# Patient Record
Sex: FEMALE | Race: OTHER | NOT HISPANIC OR LATINO
[De-identification: names, ages, dates, MRNs, and addresses within clinical notes are randomized per-mention and may not be internally consistent; named-entity substitution may affect disease eponyms.]

---

## 2022-07-11 ENCOUNTER — APPOINTMENT (OUTPATIENT)
Dept: ORTHOPEDIC SURGERY | Facility: CLINIC | Age: 57
End: 2022-07-11
Payer: COMMERCIAL

## 2022-07-11 VITALS — WEIGHT: 215 LBS | HEIGHT: 66 IN | BODY MASS INDEX: 34.55 KG/M2

## 2022-07-11 DIAGNOSIS — I10 ESSENTIAL (PRIMARY) HYPERTENSION: ICD-10-CM

## 2022-07-11 DIAGNOSIS — Z78.9 OTHER SPECIFIED HEALTH STATUS: ICD-10-CM

## 2022-07-11 PROCEDURE — 20611 DRAIN/INJ JOINT/BURSA W/US: CPT | Mod: 50

## 2022-07-11 PROCEDURE — 99213 OFFICE O/P EST LOW 20 MIN: CPT | Mod: 25

## 2022-07-11 PROCEDURE — J3490M: CUSTOM

## 2022-07-11 NOTE — PROCEDURE
[FreeTextEntry3] : Large Joint Injection was performed because of pain and inflammation. Anesthesia: ethyl chloride sprayed topically.. \par Celestone: 2 cc. \par Lidocaine: 3 cc. \par Marcaine: 3 cc. \par \par Medication was injected in the B/L KNEES. Patient has tried OTC's including aspirin, Ibuprofen, Aleve etc or prescription NSAIDS, and/or exercises at home and/ or physical therapy without satisfactory response and Patient has decreased mobility in the joint. After verbal consent using sterile preparation and technique. The risks, benefits, and alternatives to cortisone injection were explained in full to the patient. Risks outlined include but are not limited to infection, sepsis, bleeding, scarring, skin discoloration, temporary increase in pain, syncopal episode, failure to resolve symptoms, allergic reaction, symptom recurrence, and elevation of blood sugar in diabetics. Patient understood the risks. All questions were answered. After discussion of options, patient requested an injection. Oral informed consent was obtained and sterile prep was done of the injection site. Sterile technique was utilized for the procedure including the preparation of the solutions used for the injection. Patient tolerated the procedure well. Advised to ice the injection site this evening. Prep with betadine locally to site. Sterile technique used and Prep with alcohol locally to site. Sterile technique used. Patient tolerated procedure well. Post Procedure Instructions: Patient was advised to call if redness, pain, or fever occur and apply ice for 15 min. out of every hour for the next 12-24 hours as tolerated. patient was advised to rest the joint(s) for 2 days. \par \par Ultrasound guidance was indicated for this patient due to difficult injection. All ultrasound images have been permanently captured and stored accordingly in our picture archiving and communication system. Visualization of the needle and placement of injection was performed without complication.\par

## 2022-07-11 NOTE — HISTORY OF PRESENT ILLNESS
[Left Leg] : left leg [Right Leg] : right leg [Gradual] : gradual [8] : 8 [7] : 7 [Localized] : localized [Intermittent] : intermittent [Injection therapy] : injection therapy [Standing] : standing [Walking] : walking [Stairs] : stairs [de-identified] : Patient Complaint - 3/16/22: FOLLOW UP B/L KNEES. RIGHT KNEE STARTED BECOMING PAINFUL 6 DAYS AGO. SHE HAS\par BEEN LIMPING DUE TO THE PAIN. WORSE WITH STAIRS. LEFT KNEE IS BEGINNING TO HURT AS WELL. NO F/C/S.\par History of Present Illness\par 12/14/16: PATIENT IS A 52 YO FEMALE C/O RIGHT KNEE PAIN SINCE OCT 2016 WITH NO SPECIFIC INJURY. STATES SHE\par STARTED TO GET BETTER BUT HER PAIN CONTINUES. TAKING NAPROXEN FOR PAIN. SHE HAD XRAYS AT [facility]. PAIN\par IS WORSE WITH GOING DOWN STAIRS. STATES HER KNEE FEELS IT WILL "GIVE OUT." NO PREVIOUS INJURIES OR\par SURGERIES TO RIGHT KNEE. OCCUPATION: [employer]. NO MRI RIGHT KNEE YET\par 1/13/17: INJECTION HELPED BUT THINKS SWELLING RECURRED. HERE FOR SUPARTZ #1 RIGHT KNEE\par 1/30/17: RIGHT KNEE SUPARTZ #1\par 2/15/17: SUPARTZ #2 RIGHT KNEE\par 2/22/17: F/U RIGHT KNEE MRI\par 3/27/17: HERE FOR EUFLEXXA #1 RIGHT KNEE.\par 4/5/17: EUFLEXXA #2 RIGHT KNEE\par 4/12/17: EUFLEXXA #3 RIGHT KNEE.\par 7/24/17: HERE TO F/U ON RIGHT KNEE. WAS DOING WELL, WENT AWAY ON VACATION AND PAIN RETURNED LAST WEEK.\par OCC. FEELING OF "GIVING OUT."\par 2/27/19: PAIN RECURRED NOW LEFT WORSE THAN RIGHT WITH OCC GIVING OUT, GOOD RELIEF WITH EUFLEXXA LAST\par TIME. CANNOT HAVE SUPARTZ DUE TO ALLERGIC REACTION\par 4/10/19: HERE FOR F/U RIGHT KNEE. WILL START EUFLEXXA # 1 TODAY SUKUMAR KNEES.\par 4/17/2019: EUFLEXXA #2 BILATERAL KNEES TODAY. NO ADVERSE REACTIONS FROM FIRST SET OF INJECTION LAST\par VISIT.\par 4/24/19: EUFLEXXA #3 BILATERAL KNEES\par 6/30/21: PAIN RECURRED, NO NEW INJURY\par 8/4/21: SYNVISC #1 B/L KNEES\par 8/18/21: SYNVISC #2 B/L KNEES\par 8/25/21: SYNVISC #3 B/L KNEES\par 7/11/22: FOLLOW UP KNEES. PAIN RETURNING. GOOD RELIEF FROM [facility]SI 4 MONTHS AGO. [] : no [FreeTextEntry1] : KNEES [FreeTextEntry5] : NONE  [de-identified] : NOTHING

## 2022-07-11 NOTE — ASSESSMENT
[FreeTextEntry1] : ACUTE EXACERBATION OF CHRONIC KNEE OA\par STABLE EXAM & SYMPTOMS\par CSI DISCUSSED AND DONE TODAY, TOLERATED WELL\par EUFLEXXA  AUTH REQUESTED\par RTO P AUTH

## 2022-07-11 NOTE — IMAGING
[de-identified] : B/L KNEES\par 1. No effusion, no warmth, no ecchymosis, no erythema.\par 2. MEDIAL & LATERAL JOINT TTP\par 3. Range of motion 0-130 without pain\par 4. 5/5 quadriceps and hamstring strength\par 5. Negative Lachman, negative Abhijeet, negative anterior drawer, negative posterior drawer, negative patella apprehension; no extensor lag: no varus or valgus instability.\par 6. Motor and sensory intact distally\par 7. Negative Moose\par 8. Non-antalgic gait\par

## 2022-07-13 ENCOUNTER — APPOINTMENT (OUTPATIENT)
Dept: ORTHOPEDIC SURGERY | Facility: CLINIC | Age: 57
End: 2022-07-13

## 2022-07-13 RX ORDER — HYALURONATE SODIUM 16.8MG/2ML
16.8 SYRINGE (ML) INTRAARTICULAR
Qty: 6 | Refills: 0 | Status: ACTIVE | COMMUNITY
Start: 2022-07-13 | End: 1900-01-01

## 2022-08-17 ENCOUNTER — APPOINTMENT (OUTPATIENT)
Dept: ORTHOPEDIC SURGERY | Facility: CLINIC | Age: 57
End: 2022-08-17
Payer: COMMERCIAL

## 2022-08-17 VITALS — WEIGHT: 215 LBS | HEIGHT: 66 IN | BODY MASS INDEX: 34.55 KG/M2

## 2022-08-17 PROCEDURE — 99213 OFFICE O/P EST LOW 20 MIN: CPT | Mod: 25

## 2022-08-17 PROCEDURE — 20611 DRAIN/INJ JOINT/BURSA W/US: CPT | Mod: 50

## 2022-08-31 ENCOUNTER — APPOINTMENT (OUTPATIENT)
Dept: ORTHOPEDIC SURGERY | Facility: CLINIC | Age: 57
End: 2022-08-31
Payer: COMMERCIAL

## 2022-08-31 VITALS — WEIGHT: 215 LBS | HEIGHT: 66 IN | BODY MASS INDEX: 34.55 KG/M2

## 2022-08-31 PROCEDURE — 20611 DRAIN/INJ JOINT/BURSA W/US: CPT | Mod: 50

## 2022-08-31 PROCEDURE — 99212 OFFICE O/P EST SF 10 MIN: CPT | Mod: 25

## 2022-08-31 NOTE — ASSESSMENT
[FreeTextEntry1] : ACUTE EXACERBATION OF CHRONIC KNEE OA\par STABLE EXAM & SYMPTOMS\par GELSYN #2 B/L KNEES, TOLERATED WELL\par RTO 1 WEEK TO CONTINUE

## 2022-08-31 NOTE — HISTORY OF PRESENT ILLNESS
[5] : 5 [Dull/Aching] : dull/aching [] : This patient has had an injection before: yes [2] : 2 [Other:____] : [unfilled] [de-identified] : Patient Complaint - 3/16/22: FOLLOW UP B/L KNEES. RIGHT KNEE STARTED BECOMING PAINFUL 6 DAYS AGO. SHE HAS\par BEEN LIMPING DUE TO THE PAIN. WORSE WITH STAIRS. LEFT KNEE IS BEGINNING TO HURT AS WELL. NO F/C/S.\par History of Present Illness\par 12/14/16: PATIENT IS A 50 YO FEMALE C/O RIGHT KNEE PAIN SINCE OCT 2016 WITH NO SPECIFIC INJURY. STATES SHE\par STARTED TO GET BETTER BUT HER PAIN CONTINUES. TAKING NAPROXEN FOR PAIN. SHE HAD XRAYS AT [facility]. PAIN\par IS WORSE WITH GOING DOWN STAIRS. STATES HER KNEE FEELS IT WILL "GIVE OUT." NO PREVIOUS INJURIES OR\par SURGERIES TO RIGHT KNEE. OCCUPATION: [employer]. NO MRI RIGHT KNEE YET\par 1/13/17: INJECTION HELPED BUT THINKS SWELLING RECURRED. HERE FOR SUPARTZ #1 RIGHT KNEE\par 1/30/17: RIGHT KNEE SUPARTZ #1\par 2/15/17: SUPARTZ #2 RIGHT KNEE\par 2/22/17: F/U RIGHT KNEE MRI\par 3/27/17: HERE FOR EUFLEXXA #1 RIGHT KNEE.\par 4/5/17: EUFLEXXA #2 RIGHT KNEE\par 4/12/17: EUFLEXXA #3 RIGHT KNEE.\par 7/24/17: HERE TO F/U ON RIGHT KNEE. WAS DOING WELL, WENT AWAY ON VACATION AND PAIN RETURNED LAST WEEK.\par OCC. FEELING OF "GIVING OUT."\par 2/27/19: PAIN RECURRED NOW LEFT WORSE THAN RIGHT WITH OCC GIVING OUT, GOOD RELIEF WITH EUFLEXXA LAST\par TIME. CANNOT HAVE SUPARTZ DUE TO ALLERGIC REACTION\par 4/10/19: HERE FOR F/U RIGHT KNEE. WILL START EUFLEXXA # 1 TODAY SUKUMAR KNEES.\par 4/17/2019: EUFLEXXA #2 BILATERAL KNEES TODAY. NO ADVERSE REACTIONS FROM FIRST SET OF INJECTION LAST\par VISIT.\par 4/24/19: EUFLEXXA #3 BILATERAL KNEES\par 6/30/21: PAIN RECURRED, NO NEW INJURY\par 8/4/21: SYNVISC #1 B/L KNEES\par 8/18/21: SYNVISC #2 B/L KNEES\par 8/25/21: SYNVISC #3 B/L KNEES\par 7/11/22: FOLLOW UP KNEES. PAIN RETURNING. GOOD RELIEF FROM [facility] 4 MONTHS AGO.\par 8/17/22: FOLLOW UP KNEES\par 8/31/22: FOLLOW UP KNEES, gelsyn #2 [FreeTextEntry1] : b/l knee [de-identified] : 8/17/22 [de-identified] : b/l knee

## 2022-08-31 NOTE — PROCEDURE
[FreeTextEntry3] : Large joint injection was performed of the BILATERAL knee. The indication for this procedure was pain and inflammation. The site was prepped with alcohol, betadine, ethyl chloride sprayed topically and sterile technique used. An injection of GELSYN series #2 was used.\par \par Patient tolerated procedure well. Patient was advised to call if redness, pain of fever occur, apply ice for 15 minutes out of every hours for the next 12-24 hours as tolerated and patient was advised to rest the joint(s) for 2 days.\par \par Patient has tried OTC's including aspirin, ibuprofen, Aleve, etc or prescription NSAIDs, and/or exercises at home and/or physical therapy without satisfactory response, patient had decreased mobility in the joint and the risks, benefits and alternatives have been discussed, and verbal consent was obtained.\par \par Ultrasound guidance was indicated for this patient due to difficult injection. All ultrasound images have been permanently captured and stored accordingly in our picture archiving and communication system. Visualization of the needle and placement of injection was performed without complication.\par

## 2022-08-31 NOTE — IMAGING
[de-identified] : B/L KNEES\par 1. No effusion, no warmth, no ecchymosis, no erythema.\par 2. MEDIAL & LATERAL JOINT TTP\par 3. Range of motion 0-130 without pain\par 4. 5/5 quadriceps and hamstring strength\par 5. Negative Lachman, negative Abhijeet, negative anterior drawer, negative posterior drawer, negative patella apprehension; no extensor lag: no varus or valgus instability.\par 6. Motor and sensory intact distally\par 7. Negative Moose\par 8. Non-antalgic gait\par

## 2022-09-07 ENCOUNTER — APPOINTMENT (OUTPATIENT)
Dept: ORTHOPEDIC SURGERY | Facility: CLINIC | Age: 57
End: 2022-09-07
Payer: COMMERCIAL

## 2022-09-07 VITALS — HEIGHT: 66 IN | WEIGHT: 215 LBS | BODY MASS INDEX: 34.55 KG/M2

## 2022-09-07 PROCEDURE — 20605 DRAIN/INJ JOINT/BURSA W/O US: CPT | Mod: 50

## 2022-09-07 PROCEDURE — 99212 OFFICE O/P EST SF 10 MIN: CPT | Mod: 25

## 2022-09-07 NOTE — ASSESSMENT
[FreeTextEntry1] : ACUTE EXACERBATION OF CHRONIC KNEE OA\par STABLE EXAM & SYMPTOMS\par GELSYN #3 B/L KNEES, TOLERATED WELL\par DISCUSSED TIMING OF INJECTIONS Universal Safety Interventions

## 2022-09-07 NOTE — HISTORY OF PRESENT ILLNESS
[de-identified] : Patient Complaint - 3/16/22: FOLLOW UP B/L KNEES. RIGHT KNEE STARTED BECOMING PAINFUL 6 DAYS AGO. SHE HAS\par BEEN LIMPING DUE TO THE PAIN. WORSE WITH STAIRS. LEFT KNEE IS BEGINNING TO HURT AS WELL. NO F/C/S.\par History of Present Illness\par 12/14/16: PATIENT IS A 52 YO FEMALE C/O RIGHT KNEE PAIN SINCE OCT 2016 WITH NO SPECIFIC INJURY. STATES SHE\par STARTED TO GET BETTER BUT HER PAIN CONTINUES. TAKING NAPROXEN FOR PAIN. SHE HAD XRAYS AT [facility]. PAIN\par IS WORSE WITH GOING DOWN STAIRS. STATES HER KNEE FEELS IT WILL "GIVE OUT." NO PREVIOUS INJURIES OR\par SURGERIES TO RIGHT KNEE. OCCUPATION: [employer]. NO MRI RIGHT KNEE YET\par 1/13/17: INJECTION HELPED BUT THINKS SWELLING RECURRED. HERE FOR SUPARTZ #1 RIGHT KNEE\par 1/30/17: RIGHT KNEE SUPARTZ #1\par 2/15/17: SUPARTZ #2 RIGHT KNEE\par 2/22/17: F/U RIGHT KNEE MRI\par 3/27/17: HERE FOR EUFLEXXA #1 RIGHT KNEE.\par 4/5/17: EUFLEXXA #2 RIGHT KNEE\par 4/12/17: EUFLEXXA #3 RIGHT KNEE.\par 7/24/17: HERE TO F/U ON RIGHT KNEE. WAS DOING WELL, WENT AWAY ON VACATION AND PAIN RETURNED LAST WEEK.\par OCC. FEELING OF "GIVING OUT."\par 2/27/19: PAIN RECURRED NOW LEFT WORSE THAN RIGHT WITH OCC GIVING OUT, GOOD RELIEF WITH EUFLEXXA LAST\par TIME. CANNOT HAVE SUPARTZ DUE TO ALLERGIC REACTION\par 4/10/19: HERE FOR F/U RIGHT KNEE. WILL START EUFLEXXA # 1 TODAY SUKUMAR KNEES.\par 4/17/2019: EUFLEXXA #2 BILATERAL KNEES TODAY. NO ADVERSE REACTIONS FROM FIRST SET OF INJECTION LAST\par VISIT.\par 4/24/19: EUFLEXXA #3 BILATERAL KNEES\par 6/30/21: PAIN RECURRED, NO NEW INJURY\par 8/4/21: SYNVISC #1 B/L KNEES\par 8/18/21: SYNVISC #2 B/L KNEES\par 8/25/21: SYNVISC #3 B/L KNEES\par 7/11/22: FOLLOW UP KNEES. PAIN RETURNING. GOOD RELIEF FROM [facility] 4 MONTHS AGO.\par 8/17/22: FOLLOW UP KNEES\par 8/31/22: FOLLOW UP KNEES, gelsyn #2\par 9/7/22: FOLLOW UP KNEES, GELSYN #3, FEELING BETTER [FreeTextEntry1] : knees

## 2022-09-07 NOTE — IMAGING
[de-identified] : B/L KNEES\par 1. No effusion, no warmth, no ecchymosis, no erythema.\par 2. MEDIAL & LATERAL JOINT TTP\par 3. Range of motion 0-130 without pain\par 4. 5/5 quadriceps and hamstring strength\par 5. Negative Lachman, negative Abhijeet, negative anterior drawer, negative posterior drawer, negative patella apprehension; no extensor lag: no varus or valgus instability.\par 6. Motor and sensory intact distally\par 7. Negative Moose\par 8. Non-antalgic gait\par

## 2022-09-07 NOTE — PROCEDURE
[FreeTextEntry3] : Large joint injection was performed of the BILATERAL knee. The indication for this procedure was pain and inflammation. The site was prepped with alcohol, betadine, ethyl chloride sprayed topically and sterile technique used. An injection of GELSYN series #3 was used.\par \par Patient tolerated procedure well. Patient was advised to call if redness, pain of fever occur, apply ice for 15 minutes out of every hours for the next 12-24 hours as tolerated and patient was advised to rest the joint(s) for 2 days.\par \par Patient has tried OTC's including aspirin, ibuprofen, Aleve, etc or prescription NSAIDs, and/or exercises at home and/or physical therapy without satisfactory response, patient had decreased mobility in the joint and the risks, benefits and alternatives have been discussed, and verbal consent was obtained.\par \par Ultrasound guidance was indicated for this patient due to difficult injection. All ultrasound images have been permanently captured and stored accordingly in our picture archiving and communication system. Visualization of the needle and placement of injection was performed without complication.\par

## 2022-09-28 NOTE — PROCEDURE
[FreeTextEntry3] : Large joint injection was performed of the BILATERAL knee. The indication for this procedure was pain and inflammation. The site was prepped with alcohol, betadine, ethyl chloride sprayed topically and sterile technique used. An injection of GELSYN series #1 was used.\par \par Patient tolerated procedure well. Patient was advised to call if redness, pain of fever occur, apply ice for 15 minutes out of every hours for the next 12-24 hours as tolerated and patient was advised to rest the joint(s) for 2 days.\par \par Patient has tried OTC's including aspirin, ibuprofen, Aleve, etc or prescription NSAIDs, and/or exercises at home and/or physical therapy without satisfactory response, patient had decreased mobility in the joint and the risks, benefits and alternatives have been discussed, and verbal consent was obtained.\par \par Ultrasound guidance was indicated for this patient due to difficult injection. All ultrasound images have been permanently captured and stored accordingly in our picture archiving and communication system. Visualization of the needle and placement of injection was performed without complication.\par

## 2022-09-28 NOTE — HISTORY OF PRESENT ILLNESS
[6] : 6 [5] : 5 [Dull/Aching] : dull/aching [de-identified] : Patient Complaint - 3/16/22: FOLLOW UP B/L KNEES. RIGHT KNEE STARTED BECOMING PAINFUL 6 DAYS AGO. SHE HAS\par BEEN LIMPING DUE TO THE PAIN. WORSE WITH STAIRS. LEFT KNEE IS BEGINNING TO HURT AS WELL. NO F/C/S.\par History of Present Illness\par 12/14/16: PATIENT IS A 50 YO FEMALE C/O RIGHT KNEE PAIN SINCE OCT 2016 WITH NO SPECIFIC INJURY. STATES SHE\par STARTED TO GET BETTER BUT HER PAIN CONTINUES. TAKING NAPROXEN FOR PAIN. SHE HAD XRAYS AT [facility]. PAIN\par IS WORSE WITH GOING DOWN STAIRS. STATES HER KNEE FEELS IT WILL "GIVE OUT." NO PREVIOUS INJURIES OR\par SURGERIES TO RIGHT KNEE. OCCUPATION: [employer]. NO MRI RIGHT KNEE YET\par 1/13/17: INJECTION HELPED BUT THINKS SWELLING RECURRED. HERE FOR SUPARTZ #1 RIGHT KNEE\par 1/30/17: RIGHT KNEE SUPARTZ #1\par 2/15/17: SUPARTZ #2 RIGHT KNEE\par 2/22/17: F/U RIGHT KNEE MRI\par 3/27/17: HERE FOR EUFLEXXA #1 RIGHT KNEE.\par 4/5/17: EUFLEXXA #2 RIGHT KNEE\par 4/12/17: EUFLEXXA #3 RIGHT KNEE.\par 7/24/17: HERE TO F/U ON RIGHT KNEE. WAS DOING WELL, WENT AWAY ON VACATION AND PAIN RETURNED LAST WEEK.\par OCC. FEELING OF "GIVING OUT."\par 2/27/19: PAIN RECURRED NOW LEFT WORSE THAN RIGHT WITH OCC GIVING OUT, GOOD RELIEF WITH EUFLEXXA LAST\par TIME. CANNOT HAVE SUPARTZ DUE TO ALLERGIC REACTION\par 4/10/19: HERE FOR F/U RIGHT KNEE. WILL START EUFLEXXA # 1 TODAY SUKUMAR KNEES.\par 4/17/2019: EUFLEXXA #2 BILATERAL KNEES TODAY. NO ADVERSE REACTIONS FROM FIRST SET OF INJECTION LAST\par VISIT.\par 4/24/19: EUFLEXXA #3 BILATERAL KNEES\par 6/30/21: PAIN RECURRED, NO NEW INJURY\par 8/4/21: SYNVISC #1 B/L KNEES\par 8/18/21: SYNVISC #2 B/L KNEES\par 8/25/21: SYNVISC #3 B/L KNEES\par 7/11/22: FOLLOW UP KNEES. PAIN RETURNING. GOOD RELIEF FROM [facility] 4 MONTHS AGO.\par 8/17/22: FOLLOW UP KNEES [FreeTextEntry1] : knees [de-identified] : ice,meds

## 2022-09-28 NOTE — ASSESSMENT
[FreeTextEntry1] : ACUTE EXACERBATION OF CHRONIC KNEE OA\par STABLE EXAM & SYMPTOMS\par GELSYN #1 B/L KNEES, TOLERATED WELL\par RTO 1 WEEK TO CONTINUE

## 2022-09-28 NOTE — IMAGING
[de-identified] : B/L KNEES\par 1. No effusion, no warmth, no ecchymosis, no erythema.\par 2. MEDIAL & LATERAL JOINT TTP\par 3. Range of motion 0-130 without pain\par 4. 5/5 quadriceps and hamstring strength\par 5. Negative Lachman, negative Abhijeet, negative anterior drawer, negative posterior drawer, negative patella apprehension; no extensor lag: no varus or valgus instability.\par 6. Motor and sensory intact distally\par 7. Negative Moose\par 8. Non-antalgic gait\par

## 2023-02-13 ENCOUNTER — APPOINTMENT (OUTPATIENT)
Dept: ORTHOPEDIC SURGERY | Facility: CLINIC | Age: 58
End: 2023-02-13

## 2023-03-22 ENCOUNTER — APPOINTMENT (OUTPATIENT)
Dept: ORTHOPEDIC SURGERY | Facility: CLINIC | Age: 58
End: 2023-03-22
Payer: COMMERCIAL

## 2023-03-22 VITALS — HEIGHT: 66 IN | WEIGHT: 215 LBS | BODY MASS INDEX: 34.55 KG/M2

## 2023-03-22 PROCEDURE — 99213 OFFICE O/P EST LOW 20 MIN: CPT

## 2023-03-22 PROCEDURE — 73564 X-RAY EXAM KNEE 4 OR MORE: CPT | Mod: LT

## 2023-03-22 NOTE — REASON FOR VISIT
[FreeTextEntry2] : F/U B/L knees. Gel inj wore off\par pain coming back since jan 2023\par right is worse than left

## 2023-03-22 NOTE — ASSESSMENT
[FreeTextEntry1] : ACUTE EXACERBATION OF CHRONIC B/L KNEE OA\par STABLE EXAM & SYMPTOMS\par GOOD RELIEF FROM GELSYN 6 MONTHS AGO\par REQUEST AUTH FOR GELSYN\par RTO P AUTH\par \par -The patient's diagnosis was reviewed in detail. Explained this is a chronic, progressive deteriorating condition that may need treatment from time to time as the flare-ups occur. \par -The natural progression of Osteoarthritis was explained to the patient. We discussed the possible treatment options from conservative to operative. \par -We discussed prescription strength NSAIDs, Glucosamine and Chondroitin sulfate, and Physical Therapy as well different types of injections including viscosupplementation. \par -We also discussed that at some point they may progress to needed a total knee replacement. \par \par

## 2023-03-22 NOTE — IMAGING
[de-identified] : BILAT KNEE\par Mild effusion, no warmth, no ecchymosis, no erythema.\par MEDIAL JOINT TTP, +crepitus pf joint\par Range of motion 0-125 without pain\par 5/5 quadriceps and hamstring strength\par Motor and sensory intact distally\par Negative Moose\par Non antalgic gait\par  [Bilateral] : knee bilaterally [All Views] : anteroposterior, lateral, skyline, and anteroposterior standing [Moderate tricompartmental OA lateral narrowing] : Moderate tricompartmental OA lateral narrowing

## 2023-03-27 ENCOUNTER — NON-APPOINTMENT (OUTPATIENT)
Age: 58
End: 2023-03-27

## 2023-03-27 ENCOUNTER — APPOINTMENT (OUTPATIENT)
Dept: ORTHOPEDIC SURGERY | Facility: CLINIC | Age: 58
End: 2023-03-27
Payer: COMMERCIAL

## 2023-03-27 PROCEDURE — 20611 DRAIN/INJ JOINT/BURSA W/US: CPT | Mod: 50

## 2023-03-27 PROCEDURE — 99213 OFFICE O/P EST LOW 20 MIN: CPT | Mod: 25

## 2023-03-27 PROCEDURE — J3490M: CUSTOM

## 2023-03-27 NOTE — PROCEDURE
[FreeTextEntry3] : Large Joint Injection was performed because of pain and inflammation. Anesthesia: ethyl chloride sprayed topically.. \par Celestone: 2 cc. \par Marcaine: 5 cc. \par \par Medication was injected in the BILAT KNEE. Patient has tried OTC's including aspirin, Ibuprofen, Aleve etc or prescription NSAIDS, and/or exercises at home and/ or physical therapy without satisfactory response and Patient has decreased mobility in the joint. The risks, benefits, and alternatives to cortisone injection were explained in full to the patient. Risks outlined include but are not limited to infection, sepsis, bleeding, scarring, skin discoloration, temporary increase in pain, syncopal episode, failure to resolve symptoms, allergic reaction, symptom recurrence, and elevation of blood sugar in diabetics. Patient understood the risks. All questions were answered. After discussion of options, patient requested an injection. Oral informed consent was obtained and sterile prep was done of the injection site with betadyne and alcohol. Sterile technique was utilized for the procedure including the preparation of the solutions used for the injection. Patient tolerated the procedure well. Advised to ice the injection site this evening. Post Procedure Instructions: Patient was advised to call if redness, pain, or fever occur and apply ice for 15 min. out of every hour for the next 12-24 hours as tolerated. patient was advised to rest the joint(s) for 2 days. \par \par Ultrasound guidance was indicated for this patient due to difficult injection. All ultrasound images have been permanently captured and stored accordingly in our picture archiving and communication system. Visualization of the needle and placement of injection was performed without complication.\par

## 2023-03-27 NOTE — ASSESSMENT
[FreeTextEntry1] : ACUTE EXACBERATION OF CHRONIC B/L KNEE OA\par STABLE EXAM & SYMPTOMS\par REPEAT CSI DISCUSSED AND DONE TODAY, TOLERATED WELL\par NOT APPROVED FOR VISCO AGAIN UNTIL 1 YEAR AIDNA (SEPT)\par WILL SET UP FOR VISCO-3\par

## 2023-03-27 NOTE — IMAGING
[de-identified] : b/l knees\par No effusion, no warmth, no ecchymosis, no erythema.\par medial joint ttp\par Range of motion 0-130 without pain\par 5/5 quadriceps and hamstring strength\par Motor and sensory intact distally\par Negative Moose\par antalgic gait\par

## 2023-03-27 NOTE — HISTORY OF PRESENT ILLNESS
[8] : 8 [7] : 7 [Dull/Aching] : dull/aching [de-identified] : 03/27/2023 follow up bl knees knee pain is worse, good relief from visco but pain returning [FreeTextEntry1] : knees [de-identified] : none

## 2023-04-03 ENCOUNTER — APPOINTMENT (OUTPATIENT)
Dept: ORTHOPEDIC SURGERY | Facility: CLINIC | Age: 58
End: 2023-04-03
Payer: COMMERCIAL

## 2023-04-03 VITALS — BODY MASS INDEX: 34.55 KG/M2 | WEIGHT: 215 LBS | HEIGHT: 66 IN

## 2023-04-03 DIAGNOSIS — Z78.9 OTHER SPECIFIED HEALTH STATUS: ICD-10-CM

## 2023-04-03 PROCEDURE — 99212 OFFICE O/P EST SF 10 MIN: CPT | Mod: 25

## 2023-04-03 PROCEDURE — 20611 DRAIN/INJ JOINT/BURSA W/US: CPT | Mod: 50

## 2023-04-03 NOTE — ASSESSMENT
[FreeTextEntry1] : R/B/A VISCO-3 DISCUSSED\par VISCO-3 #1 B/L KNEES, TOLERATED WELL\par RTO 1 WEEK TO CONT SERIES

## 2023-04-03 NOTE — HISTORY OF PRESENT ILLNESS
[7] : 7 [4] : 4 [Dull/Aching] : dull/aching [de-identified] : 03/27/2023 follow up bl knees knee pain is worse, good relief from visco but pain returning\par 4/3/23: STARTING VISCO-3 TODAY B/L KNEES [FreeTextEntry1] : B/L Knees [FreeTextEntry3] : N/A Chronic [FreeTextEntry5] : 69 y/o RHD F eval B/L Knees. pt presents with atraumatic chronic pain due to HX of OA prior TX of CSI on 3/27/23 with some relief Visco 3 #1 today. pt states pain levels have decreased since last visit  [de-identified] : none

## 2023-04-03 NOTE — IMAGING
[de-identified] : b/l knees\par No effusion, no warmth, no ecchymosis, no erythema.\par medial joint ttp\par Range of motion 0-130 without pain\par 5/5 quadriceps and hamstring strength\par Motor and sensory intact distally\par Negative Moose\par antalgic gait\par

## 2023-04-03 NOTE — PROCEDURE
[FreeTextEntry3] : Large joint injection  was performed of the BILAT  knee. \par The indication for this procedure was pain, inflammation and x-ray evidence of Osteoarthritis on this or prior visit. The site was prepped with alcohol, betadine, ethyl chloride sprayed topically and sterile technique used. \par An injection of VISCO-3 2.5 ML series; #1  was used.  \par Patient was advised to call if redness, pain or fever occur, apply ice for 15 minutes out of every hour for the next 12-24 hours as tolerated and patient was advised to rest the joint(s) for 2 days. Patient has tried OTC's including aspirin, Ibuprofen, Aleve, etc or prescription NSAIDS, and/or exercises at home and/or physical therapy without satisfactory response, patient had decreased mobility in the joint and the risks benefits, and alternatives have been discussed, and verbal consent was obtained. \par Ultrasound guidance was indicated for this patient due to prior failure or difficult injection. All ultrasound images have been permanently captured and stored accordingly in our picture archiving and communication system. Visualization of the needle and placement of injection was performed without complication.\par

## 2023-04-10 ENCOUNTER — NON-APPOINTMENT (OUTPATIENT)
Age: 58
End: 2023-04-10

## 2023-04-10 ENCOUNTER — APPOINTMENT (OUTPATIENT)
Dept: ORTHOPEDIC SURGERY | Facility: CLINIC | Age: 58
End: 2023-04-10
Payer: COMMERCIAL

## 2023-04-10 VITALS — WEIGHT: 215 LBS | BODY MASS INDEX: 34.55 KG/M2 | HEIGHT: 66 IN

## 2023-04-10 PROCEDURE — 20611 DRAIN/INJ JOINT/BURSA W/US: CPT | Mod: 50

## 2023-04-10 PROCEDURE — 99212 OFFICE O/P EST SF 10 MIN: CPT | Mod: 25

## 2023-04-10 NOTE — PROCEDURE
[FreeTextEntry3] : Large joint injection  was performed of the BILAT  knee. \par The indication for this procedure was pain, inflammation and x-ray evidence of Osteoarthritis on this or prior visit. The site was prepped with alcohol, betadine, ethyl chloride sprayed topically and sterile technique used. \par An injection of VISCO-3 2.5 ML series; #2  was used.  \par Patient was advised to call if redness, pain or fever occur, apply ice for 15 minutes out of every hour for the next 12-24 hours as tolerated and patient was advised to rest the joint(s) for 2 days. Patient has tried OTC's including aspirin, Ibuprofen, Aleve, etc or prescription NSAIDS, and/or exercises at home and/or physical therapy without satisfactory response, patient had decreased mobility in the joint and the risks benefits, and alternatives have been discussed, and verbal consent was obtained. \par Ultrasound guidance was indicated for this patient due to prior failure or difficult injection. All ultrasound images have been permanently captured and stored accordingly in our picture archiving and communication system. Visualization of the needle and placement of injection was performed without complication.\par

## 2023-04-10 NOTE — IMAGING
[de-identified] : b/l knees\par No effusion, no warmth, no ecchymosis, no erythema.\par medial joint ttp\par Range of motion 0-130 without pain\par 5/5 quadriceps and hamstring strength\par Motor and sensory intact distally\par Negative Moose\par antalgic gait\par

## 2023-04-10 NOTE — HISTORY OF PRESENT ILLNESS
[7] : 7 [4] : 4 [Dull/Aching] : dull/aching [Intermittent] : intermittent [Rest] : rest [Injection therapy] : injection therapy [Walking] : walking [de-identified] : 03/27/2023 follow up bl knees knee pain is worse, good relief from visco but pain returning\par 4/3/23: STARTING VISCO-3 TODAY B/L KNEES\par \par 04/10/23 bl knees visco 3 # 2  [FreeTextEntry1] : B/L Knees [FreeTextEntry3] : N/A Chronic [FreeTextEntry5] : 67 y/o RHD F eval B/L Knees. pt presents with atraumatic chronic pain due to HX of OA prior TX of CSI on 3/27/23 with some relief Visco 3 #1 today. pt states pain levels have decreased since last visit  [de-identified] : visco 3 injections

## 2023-04-17 ENCOUNTER — NON-APPOINTMENT (OUTPATIENT)
Age: 58
End: 2023-04-17

## 2023-04-17 ENCOUNTER — APPOINTMENT (OUTPATIENT)
Dept: ORTHOPEDIC SURGERY | Facility: CLINIC | Age: 58
End: 2023-04-17
Payer: COMMERCIAL

## 2023-04-17 VITALS — HEIGHT: 66 IN | WEIGHT: 215 LBS | BODY MASS INDEX: 34.55 KG/M2

## 2023-04-17 PROCEDURE — 99212 OFFICE O/P EST SF 10 MIN: CPT | Mod: 25

## 2023-04-17 PROCEDURE — 20611 DRAIN/INJ JOINT/BURSA W/US: CPT | Mod: 50

## 2023-04-17 NOTE — PROCEDURE
[FreeTextEntry3] : Large joint injection  was performed of the BILAT  knee. \par The indication for this procedure was pain, inflammation and x-ray evidence of Osteoarthritis on this or prior visit. The site was prepped with alcohol, betadine, ethyl chloride sprayed topically and sterile technique used. \par An injection of VISCO-3 2.5 ML series; #3  was used.  \par Patient was advised to call if redness, pain or fever occur, apply ice for 15 minutes out of every hour for the next 12-24 hours as tolerated and patient was advised to rest the joint(s) for 2 days. Patient has tried OTC's including aspirin, Ibuprofen, Aleve, etc or prescription NSAIDS, and/or exercises at home and/or physical therapy without satisfactory response, patient had decreased mobility in the joint and the risks benefits, and alternatives have been discussed, and verbal consent was obtained. \par Ultrasound guidance was indicated for this patient due to prior failure or difficult injection. All ultrasound images have been permanently captured and stored accordingly in our picture archiving and communication system. Visualization of the needle and placement of injection was performed without complication.\par

## 2023-04-17 NOTE — IMAGING
[de-identified] : b/l knees\par No effusion, no warmth, no ecchymosis, no erythema.\par medial joint ttp\par Range of motion 0-130 without pain\par 5/5 quadriceps and hamstring strength\par Motor and sensory intact distally\par Negative Moose\par antalgic gait\par

## 2023-05-04 ENCOUNTER — APPOINTMENT (OUTPATIENT)
Dept: ORTHOPEDIC SURGERY | Facility: CLINIC | Age: 58
End: 2023-05-04

## 2023-08-27 ENCOUNTER — NON-APPOINTMENT (OUTPATIENT)
Age: 58
End: 2023-08-27

## 2023-09-01 ENCOUNTER — APPOINTMENT (OUTPATIENT)
Dept: ORTHOPEDIC SURGERY | Facility: CLINIC | Age: 58
End: 2023-09-01
Payer: OTHER GOVERNMENT

## 2023-09-01 VITALS — HEIGHT: 66 IN

## 2023-09-01 DIAGNOSIS — S62.525A NONDISPLACED FRACTURE OF DISTAL PHALANX OF LEFT THUMB, INITIAL ENCOUNTER FOR CLOSED FRACTURE: ICD-10-CM

## 2023-09-01 PROCEDURE — 99204 OFFICE O/P NEW MOD 45 MIN: CPT | Mod: 57

## 2023-09-01 PROCEDURE — 26740 TREAT FINGER FRACTURE EACH: CPT

## 2023-09-01 NOTE — WORK
[Crush Injury] : crush injury [Was the competent medical cause of the injury] : was the competent medical cause of the injury [Are consistent with the injury] : are consistent with the injury [Consistent with my objective findings] : consistent with my objective findings [Partial] : partial [Cannot return to work because ________] : cannot return to work because [unfilled] [I provided the services listed above] :  I provided the services listed above.

## 2023-09-01 NOTE — DISCUSSION/SUMMARY
[de-identified] : Discussed the nature of the diagnosis and risk and benefits of different modalities of treatment. [facility] x-rays reviewed. Distal phalanx fx. Placed in a thumb splint.  OOW. RTO 2 weeks.

## 2023-09-01 NOTE — PHYSICAL EXAM
[1st] : 1st [Distal Phalanx] : distal phalanx [IP Joint] : IP joint [Left] : left fingers [Outside films reviewed] : Outside films reviewed [FreeTextEntry9] : Alexandra 8/28/23: possible nondisplaced fx, ulnar corner distal phalanx

## 2023-09-01 NOTE — HISTORY OF PRESENT ILLNESS
[8] : 8 [Sharp] : sharp [Constant] : constant [de-identified] : 58 year old female caught her LEFT thumb between rolling machine and rollers. She was seen at  for x-rays and informed of a fx.  STEPHY DOI: 8/28/23 [] : no [FreeTextEntry1] : left thumb [FreeTextEntry5] :  8/28/23, left hand. Works as at [employer]. Reports she was using rolling machine when it pinned her left thumb causing sharp pain. Went to , x-ray done and splinted.

## 2023-09-15 ENCOUNTER — APPOINTMENT (OUTPATIENT)
Dept: ORTHOPEDIC SURGERY | Facility: CLINIC | Age: 58
End: 2023-09-15
Payer: OTHER GOVERNMENT

## 2023-09-15 VITALS — WEIGHT: 215 LBS | BODY MASS INDEX: 34.55 KG/M2 | HEIGHT: 66 IN

## 2023-09-15 PROCEDURE — 99024 POSTOP FOLLOW-UP VISIT: CPT

## 2023-09-15 PROCEDURE — 73140 X-RAY EXAM OF FINGER(S): CPT | Mod: LT

## 2023-10-13 ENCOUNTER — NON-APPOINTMENT (OUTPATIENT)
Age: 58
End: 2023-10-13

## 2023-10-13 ENCOUNTER — APPOINTMENT (OUTPATIENT)
Dept: ORTHOPEDIC SURGERY | Facility: CLINIC | Age: 58
End: 2023-10-13

## 2023-10-13 ENCOUNTER — APPOINTMENT (OUTPATIENT)
Dept: ORTHOPEDIC SURGERY | Facility: CLINIC | Age: 58
End: 2023-10-13
Payer: OTHER GOVERNMENT

## 2023-10-13 VITALS — HEIGHT: 66 IN | WEIGHT: 215 LBS | BODY MASS INDEX: 34.55 KG/M2

## 2023-10-13 DIAGNOSIS — S62.525D NONDISPLACED FRACTURE OF DISTAL PHALANX OF LEFT THUMB, SUBSEQUENT ENCOUNTER FOR FRACTURE WITH ROUTINE HEALING: ICD-10-CM

## 2023-10-13 PROCEDURE — 99024 POSTOP FOLLOW-UP VISIT: CPT

## 2023-11-20 ENCOUNTER — APPOINTMENT (OUTPATIENT)
Dept: ORTHOPEDIC SURGERY | Facility: CLINIC | Age: 58
End: 2023-11-20
Payer: COMMERCIAL

## 2023-11-20 VITALS — WEIGHT: 202 LBS | HEIGHT: 66 IN | BODY MASS INDEX: 32.47 KG/M2

## 2023-11-20 PROCEDURE — 99213 OFFICE O/P EST LOW 20 MIN: CPT

## 2023-11-21 RX ORDER — HYALURONATE SODIUM 16.8MG/2ML
16.8 SYRINGE (ML) INTRAARTICULAR
Qty: 6 | Refills: 0 | Status: ACTIVE | COMMUNITY
Start: 2023-11-21 | End: 1900-01-01

## 2023-12-15 ENCOUNTER — APPOINTMENT (OUTPATIENT)
Dept: ORTHOPEDIC SURGERY | Facility: CLINIC | Age: 58
End: 2023-12-15
Payer: COMMERCIAL

## 2023-12-15 PROCEDURE — 20611 DRAIN/INJ JOINT/BURSA W/US: CPT | Mod: 50

## 2023-12-15 PROCEDURE — 99212 OFFICE O/P EST SF 10 MIN: CPT | Mod: 25

## 2023-12-15 NOTE — HISTORY OF PRESENT ILLNESS
[7] : 7 [4] : 4 [Dull/Aching] : dull/aching [Localized] : localized [Sharp] : sharp [Constant] : constant [Household chores] : household chores [Rest] : rest [Injection therapy] : injection therapy [Walking] : walking [Stairs] : stairs [de-identified] : 03/27/2023 follow up bl knees knee pain is worse, good relief from visco but pain returning 4/3/23: STARTING VISCO-3 TODAY B/L KNEES  04/10/23 bl knees visco 3 # 2   11/20/23 follow up bl knees finished visco 3 last visit, pain is back  12/15/23: here for bilateral knee Gelsyn Injection #1.  [FreeTextEntry1] : B/L Knees [FreeTextEntry3] : N/A Chronic [FreeTextEntry5] : 67 y/o RHD F eval B/L Knees. pt presents with atraumatic chronic pain due to HX of OA prior TX of CSI on 3/27/23 with some relief Visco 3 #1 today. pt states pain levels have decreased since last visit  [de-identified] : visco 3 injections

## 2023-12-15 NOTE — PROCEDURE
[FreeTextEntry3] : Large joint injection was performed of the BILATERAL knee.  The indication for this procedure was pain, inflammation and x-ray evidence of Osteoarthritis on this or prior visit. The site was prepped with alcohol, betadine, ethyl chloride sprayed topically and sterile technique used.  An injection of GELSYN 2ml, series #1 was used.   Patient was advised to call if redness, pain or fever occur, apply ice for 15 minutes out of every hour for the next 12-24 hours as tolerated and patient was advised to rest the joint(s) for 2 days. Patient has tried OTC's including aspirin, Ibuprofen, Aleve, etc or prescription NSAIDS, and/or exercises at home and/or physical therapy without satisfactory response, patient had decreased mobility in the joint and the risks benefits, and alternatives have been discussed, and verbal consent was obtained.  Ultrasound guidance was indicated for this patient due to OBESITY. All ultrasound images have been permanently captured and stored accordingly in our picture archiving and communication system. Visualization of the needle and placement of injection was performed without complication.

## 2023-12-15 NOTE — IMAGING
[de-identified] : Valgus deformity No effusion, no warmth, no ecchymosis Lateral joint line and anterior tenderness to palpation  Range of motion 0-110 with associated crepitus 5/5 quadriceps and hamstring strength Ligamentously stable Motor and sensory intact distally Non antalgic gait Negative Moose

## 2023-12-22 ENCOUNTER — APPOINTMENT (OUTPATIENT)
Dept: ORTHOPEDIC SURGERY | Facility: CLINIC | Age: 58
End: 2023-12-22
Payer: COMMERCIAL

## 2023-12-22 VITALS — WEIGHT: 202 LBS | BODY MASS INDEX: 32.47 KG/M2 | HEIGHT: 66 IN

## 2023-12-22 PROCEDURE — 99212 OFFICE O/P EST SF 10 MIN: CPT | Mod: 25

## 2023-12-22 PROCEDURE — 20610 DRAIN/INJ JOINT/BURSA W/O US: CPT | Mod: 50

## 2023-12-22 NOTE — PROCEDURE
[FreeTextEntry3] : Large joint injection was performed of the BILATERAL knee.  The indication for this procedure was pain, inflammation and x-ray evidence of Osteoarthritis on this or prior visit. The site was prepped with alcohol, betadine, ethyl chloride sprayed topically and sterile technique used.  An injection of GELSYN 2ml, series #2 was used.   Patient was advised to call if redness, pain or fever occur, apply ice for 15 minutes out of every hour for the next 12-24 hours as tolerated and patient was advised to rest the joint(s) for 2 days. Patient has tried OTC's including aspirin, Ibuprofen, Aleve, etc or prescription NSAIDS, and/or exercises at home and/or physical therapy without satisfactory response, patient had decreased mobility in the joint and the risks benefits, and alternatives have been discussed, and verbal consent was obtained.  Ultrasound guidance was indicated for this patient due to OBESITY. All ultrasound images have been permanently captured and stored accordingly in our picture archiving and communication system. Visualization of the needle and placement of injection was performed without complication.

## 2023-12-22 NOTE — IMAGING
[de-identified] : Valgus deformity No effusion, no warmth, no ecchymosis Lateral joint line and anterior tenderness to palpation  Range of motion 0-110 with associated crepitus 5/5 quadriceps and hamstring strength Ligamentously stable Motor and sensory intact distally Non antalgic gait Negative Moose

## 2023-12-22 NOTE — HISTORY OF PRESENT ILLNESS
[de-identified] : 03/27/2023 follow up bl knees knee pain is worse, good relief from visco but pain returning 4/3/23: STARTING VISCO-3 TODAY B/L KNEES  04/10/23 bl knees visco 3 # 2   11/20/23 follow up bl knees finished visco 3 last visit, pain is back  12/15/23: here for bilateral knee Gelsyn Injection #1.  [7] : 7 [4] : 4 [Dull/Aching] : dull/aching [Localized] : localized [Sharp] : sharp [Constant] : constant [Household chores] : household chores [Rest] : rest [Injection therapy] : injection therapy [Walking] : walking [Stairs] : stairs [FreeTextEntry1] : B/L Knees [FreeTextEntry3] : N/A Chronic [FreeTextEntry5] : 69 y/o RHD F eval B/L Knees. pt presents with atraumatic chronic pain due to HX of OA prior TX of CSI on 3/27/23 with some relief Visco 3 #1 today. pt states pain levels have decreased since last visit  [de-identified] : visco 3 injections

## 2023-12-29 ENCOUNTER — NON-APPOINTMENT (OUTPATIENT)
Age: 58
End: 2023-12-29

## 2023-12-29 ENCOUNTER — APPOINTMENT (OUTPATIENT)
Dept: ORTHOPEDIC SURGERY | Facility: CLINIC | Age: 58
End: 2023-12-29
Payer: COMMERCIAL

## 2023-12-29 VITALS — WEIGHT: 202 LBS | BODY MASS INDEX: 32.47 KG/M2 | HEIGHT: 66 IN

## 2023-12-29 DIAGNOSIS — E66.01 MORBID (SEVERE) OBESITY DUE TO EXCESS CALORIES: ICD-10-CM

## 2023-12-29 DIAGNOSIS — M17.12 UNILATERAL PRIMARY OSTEOARTHRITIS, LEFT KNEE: ICD-10-CM

## 2023-12-29 PROCEDURE — 20611 DRAIN/INJ JOINT/BURSA W/US: CPT | Mod: 50

## 2023-12-29 PROCEDURE — 99212 OFFICE O/P EST SF 10 MIN: CPT | Mod: 25

## 2023-12-29 NOTE — ASSESSMENT
[FreeTextEntry1] : B/L GELSYN INJECTION #3 TODAY - TOLERATED WELL  DISCUSSED POST PROCEDURE RECOMMENDATIONS  DISCUSSED TIMING OF REPEAT INJECTIONS - ON OR AFTER 6/30/24  RTC PRN

## 2023-12-29 NOTE — HISTORY OF PRESENT ILLNESS
[5] : 5 [Dull/Aching] : dull/aching [de-identified] : 03/27/2023 follow up bl knees knee pain is worse, good relief from visco but pain returning 4/3/23: STARTING VISCO-3 TODAY B/L KNEES  04/10/23 bl knees visco 3 # 2   11/20/23 follow up bl knees finished visco 3 last visit, pain is back  12/15/23: here for bilateral knee Gelsyn Injection #1.   12/29/23: Here for b/l knee Gelsyn injection #3.  [FreeTextEntry1] : knees

## 2023-12-29 NOTE — IMAGING
[de-identified] : Valgus deformity No effusion, no warmth, no ecchymosis Lateral joint line and anterior tenderness to palpation  Range of motion 0-110 with associated crepitus 5/5 quadriceps and hamstring strength Ligamentously stable Motor and sensory intact distally Non antalgic gait Negative Moose

## 2024-01-29 ENCOUNTER — APPOINTMENT (OUTPATIENT)
Dept: ORTHOPEDIC SURGERY | Facility: CLINIC | Age: 59
End: 2024-01-29

## 2024-02-05 ENCOUNTER — APPOINTMENT (OUTPATIENT)
Dept: ORTHOPEDIC SURGERY | Facility: CLINIC | Age: 59
End: 2024-02-05
Payer: COMMERCIAL

## 2024-02-05 ENCOUNTER — NON-APPOINTMENT (OUTPATIENT)
Age: 59
End: 2024-02-05

## 2024-02-05 VITALS — WEIGHT: 202 LBS | BODY MASS INDEX: 32.47 KG/M2 | HEIGHT: 66 IN

## 2024-02-05 PROCEDURE — 99215 OFFICE O/P EST HI 40 MIN: CPT

## 2024-02-05 RX ORDER — VALSARTAN 40 MG/1
TABLET, COATED ORAL
Refills: 0 | Status: ACTIVE | COMMUNITY

## 2024-02-05 RX ORDER — DULOXETINE HYDROCHLORIDE 20 MG/1
20 CAPSULE, DELAYED RELEASE PELLETS ORAL
Refills: 0 | Status: ACTIVE | COMMUNITY

## 2024-02-05 NOTE — PHYSICAL EXAM
[Left] : left knee [] : lateral joint line tenderness [NL (140)] : flexion 140 degrees [NL (0)] : extension 0 degrees

## 2024-02-05 NOTE — HISTORY OF PRESENT ILLNESS
[7] : 7 [Sharp] : sharp [Constant] : constant [Meds] : meds [de-identified] : Patient is a 58 year old female complaining of bilateral knee pain R>L.  She finished a series of Gelsyn with Dr Ortiz in Dec 2023 and had minimal relief. She takes NSAIDs. She would like to discuss surgical intervention. There was no recent injury to her knees.   [] : no [de-identified] : cold weather [de-identified] : Dr Ortiz

## 2024-02-05 NOTE — ASSESSMENT
[FreeTextEntry1] : 58F p/w R knee OA with severe exacerbation  She would lke to proceed with R TKA, r b a explained to patient, we will call to schedule, preop CT to eval bone loss   We discussed my findings and the natural history of their condition. We talked about the details of the proposed surgery and the recovery. We discussed the material risks, possible benefits and alternatives to surgery. The risks include but are not limited to infection, bleeding and possible need for blood transfusion, fracture, bowel blockage, bladder retention or infection, need for reoperation, stiffness and/or limited range of motion, possible damage to nerves and blood vessels, failure of fixation of components, risk of deep vein thromboses and pulmonary embolism, wound healing problems, dislocation, and possible leg length discrepancy. Although incredibly rare, we also discussed the risks of a cardiac event, stroke and even death during, or following, the surgery. We discussed the type of implants the patient will be receiving and the type of fixation that will be used, as well as whether a robot or computer navigation aide will be used. The patient understands they will need medical clearance and will attend a preoperative joint education class. We also discussed the type of anesthesia they will receive, and the risks associated with hospital or rehab length of stay, obesity, diabetes and smoking.

## 2024-02-08 ENCOUNTER — APPOINTMENT (OUTPATIENT)
Dept: CT IMAGING | Facility: CLINIC | Age: 59
End: 2024-02-08
Payer: COMMERCIAL

## 2024-02-08 PROCEDURE — 73700 CT LOWER EXTREMITY W/O DYE: CPT | Mod: RT

## 2024-04-01 ENCOUNTER — APPOINTMENT (OUTPATIENT)
Dept: ORTHOPEDIC SURGERY | Facility: HOSPITAL | Age: 59
End: 2024-04-01

## 2024-04-19 ENCOUNTER — APPOINTMENT (OUTPATIENT)
Dept: ORTHOPEDIC SURGERY | Facility: CLINIC | Age: 59
End: 2024-04-19

## 2024-06-13 ENCOUNTER — NON-APPOINTMENT (OUTPATIENT)
Age: 59
End: 2024-06-13

## 2024-06-13 NOTE — PROCEDURE
[FreeTextEntry3] : Large joint injection was performed of the BILATERAL knee.  The indication for this procedure was pain, inflammation and x-ray evidence of Osteoarthritis on this or prior visit. The site was prepped with alcohol, betadine, ethyl chloride sprayed topically and sterile technique used.  An injection of GELSYN 2ml, series #3 was used.   Patient was advised to call if redness, pain or fever occur, apply ice for 15 minutes out of every hour for the next 12-24 hours as tolerated and patient was advised to rest the joint(s) for 2 days. Patient has tried OTC's including aspirin, Ibuprofen, Aleve, etc or prescription NSAIDS, and/or exercises at home and/or physical therapy without satisfactory response, patient had decreased mobility in the joint and the risks benefits, and alternatives have been discussed, and verbal consent was obtained.  Ultrasound guidance was indicated for this patient due to OBESITY. All ultrasound images have been permanently captured and stored accordingly in our picture archiving and communication system. Visualization of the needle and placement of injection was performed without complication.
show

## 2024-06-25 ENCOUNTER — OUTPATIENT (OUTPATIENT)
Dept: OUTPATIENT SERVICES | Facility: HOSPITAL | Age: 59
LOS: 1 days | Discharge: HOME | End: 2024-06-25
Payer: COMMERCIAL

## 2024-06-25 ENCOUNTER — TRANSCRIPTION ENCOUNTER (OUTPATIENT)
Age: 59
End: 2024-06-25

## 2024-06-25 VITALS
HEIGHT: 66.5 IN | WEIGHT: 214.07 LBS | RESPIRATION RATE: 17 BRPM | DIASTOLIC BLOOD PRESSURE: 85 MMHG | OXYGEN SATURATION: 97 % | TEMPERATURE: 98 F | HEART RATE: 71 BPM | SYSTOLIC BLOOD PRESSURE: 130 MMHG

## 2024-06-25 DIAGNOSIS — Z98.890 OTHER SPECIFIED POSTPROCEDURAL STATES: Chronic | ICD-10-CM

## 2024-06-25 DIAGNOSIS — M17.11 UNILATERAL PRIMARY OSTEOARTHRITIS, RIGHT KNEE: ICD-10-CM

## 2024-06-25 DIAGNOSIS — I10 ESSENTIAL (PRIMARY) HYPERTENSION: ICD-10-CM

## 2024-06-25 DIAGNOSIS — Z98.84 BARIATRIC SURGERY STATUS: Chronic | ICD-10-CM

## 2024-06-25 DIAGNOSIS — Z01.818 ENCOUNTER FOR OTHER PREPROCEDURAL EXAMINATION: ICD-10-CM

## 2024-06-25 LAB
A1C WITH ESTIMATED AVERAGE GLUCOSE RESULT: 5.3 % — SIGNIFICANT CHANGE UP (ref 4–5.6)
ALBUMIN SERPL ELPH-MCNC: 3.9 G/DL — SIGNIFICANT CHANGE UP (ref 3.3–5)
ALP SERPL-CCNC: 133 U/L — HIGH (ref 40–120)
ALT FLD-CCNC: 30 U/L — SIGNIFICANT CHANGE UP (ref 12–78)
ANION GAP SERPL CALC-SCNC: 4 MMOL/L — LOW (ref 5–17)
APTT BLD: 35.3 SEC — SIGNIFICANT CHANGE UP (ref 24.5–35.6)
AST SERPL-CCNC: 29 U/L — SIGNIFICANT CHANGE UP (ref 15–37)
BASOPHILS # BLD AUTO: 0.02 K/UL — SIGNIFICANT CHANGE UP (ref 0–0.2)
BASOPHILS NFR BLD AUTO: 0.3 % — SIGNIFICANT CHANGE UP (ref 0–2)
BILIRUB SERPL-MCNC: 0.6 MG/DL — SIGNIFICANT CHANGE UP (ref 0.2–1.2)
BLD GP AB SCN SERPL QL: SIGNIFICANT CHANGE UP
BUN SERPL-MCNC: 10 MG/DL — SIGNIFICANT CHANGE UP (ref 7–23)
CALCIUM SERPL-MCNC: 9.5 MG/DL — SIGNIFICANT CHANGE UP (ref 8.5–10.1)
CHLORIDE SERPL-SCNC: 107 MMOL/L — SIGNIFICANT CHANGE UP (ref 96–108)
CO2 SERPL-SCNC: 27 MMOL/L — SIGNIFICANT CHANGE UP (ref 22–31)
CREAT SERPL-MCNC: 0.79 MG/DL — SIGNIFICANT CHANGE UP (ref 0.5–1.3)
EGFR: 87 ML/MIN/1.73M2 — SIGNIFICANT CHANGE UP
EOSINOPHIL # BLD AUTO: 0.13 K/UL — SIGNIFICANT CHANGE UP (ref 0–0.5)
EOSINOPHIL NFR BLD AUTO: 1.9 % — SIGNIFICANT CHANGE UP (ref 0–6)
ESTIMATED AVERAGE GLUCOSE: 105 MG/DL — SIGNIFICANT CHANGE UP (ref 68–114)
GLUCOSE SERPL-MCNC: 97 MG/DL — SIGNIFICANT CHANGE UP (ref 70–99)
HCT VFR BLD CALC: 41.6 % — SIGNIFICANT CHANGE UP (ref 34.5–45)
HCV AB S/CO SERPL IA: 0.16 S/CO — SIGNIFICANT CHANGE UP (ref 0–0.99)
HCV AB SERPL-IMP: SIGNIFICANT CHANGE UP
HGB BLD-MCNC: 13.8 G/DL — SIGNIFICANT CHANGE UP (ref 11.5–15.5)
IMM GRANULOCYTES NFR BLD AUTO: 0.4 % — SIGNIFICANT CHANGE UP (ref 0–0.9)
INR BLD: 0.89 RATIO — SIGNIFICANT CHANGE UP (ref 0.85–1.18)
LYMPHOCYTES # BLD AUTO: 0.84 K/UL — LOW (ref 1–3.3)
LYMPHOCYTES # BLD AUTO: 12.4 % — LOW (ref 13–44)
MCHC RBC-ENTMCNC: 29.5 PG — SIGNIFICANT CHANGE UP (ref 27–34)
MCHC RBC-ENTMCNC: 33.2 G/DL — SIGNIFICANT CHANGE UP (ref 32–36)
MCV RBC AUTO: 88.9 FL — SIGNIFICANT CHANGE UP (ref 80–100)
MONOCYTES # BLD AUTO: 0.54 K/UL — SIGNIFICANT CHANGE UP (ref 0–0.9)
MONOCYTES NFR BLD AUTO: 8 % — SIGNIFICANT CHANGE UP (ref 2–14)
MRSA PCR RESULT.: SIGNIFICANT CHANGE UP
NEUTROPHILS # BLD AUTO: 5.19 K/UL — SIGNIFICANT CHANGE UP (ref 1.8–7.4)
NEUTROPHILS NFR BLD AUTO: 77 % — SIGNIFICANT CHANGE UP (ref 43–77)
NRBC # BLD: 0 /100 WBCS — SIGNIFICANT CHANGE UP (ref 0–0)
PLATELET # BLD AUTO: 217 K/UL — SIGNIFICANT CHANGE UP (ref 150–400)
POTASSIUM SERPL-MCNC: 4.2 MMOL/L — SIGNIFICANT CHANGE UP (ref 3.5–5.3)
POTASSIUM SERPL-SCNC: 4.2 MMOL/L — SIGNIFICANT CHANGE UP (ref 3.5–5.3)
PROT SERPL-MCNC: 7.5 GM/DL — SIGNIFICANT CHANGE UP (ref 6–8.3)
PROTHROM AB SERPL-ACNC: 10.7 SEC — SIGNIFICANT CHANGE UP (ref 9.5–13)
RBC # BLD: 4.68 M/UL — SIGNIFICANT CHANGE UP (ref 3.8–5.2)
RBC # FLD: 13.3 % — SIGNIFICANT CHANGE UP (ref 10.3–14.5)
S AUREUS DNA NOSE QL NAA+PROBE: DETECTED
SODIUM SERPL-SCNC: 138 MMOL/L — SIGNIFICANT CHANGE UP (ref 135–145)
VIT D25+D1,25 OH+D1,25 PNL SERPL-MCNC: 44.4 PG/ML — SIGNIFICANT CHANGE UP (ref 19.9–79.3)
WBC # BLD: 6.75 K/UL — SIGNIFICANT CHANGE UP (ref 3.8–10.5)
WBC # FLD AUTO: 6.75 K/UL — SIGNIFICANT CHANGE UP (ref 3.8–10.5)

## 2024-06-25 PROCEDURE — 93010 ELECTROCARDIOGRAM REPORT: CPT

## 2024-06-26 ENCOUNTER — APPOINTMENT (OUTPATIENT)
Dept: ORTHOPEDIC SURGERY | Facility: CLINIC | Age: 59
End: 2024-06-26
Payer: COMMERCIAL

## 2024-06-26 DIAGNOSIS — M17.11 UNILATERAL PRIMARY OSTEOARTHRITIS, RIGHT KNEE: ICD-10-CM

## 2024-06-26 PROCEDURE — 99214 OFFICE O/P EST MOD 30 MIN: CPT

## 2024-06-26 RX ORDER — MUPIROCIN 20 MG/G
1 OINTMENT TOPICAL
Qty: 1 | Refills: 0
Start: 2024-06-26 | End: 2024-06-30

## 2024-07-01 ENCOUNTER — NON-APPOINTMENT (OUTPATIENT)
Age: 59
End: 2024-07-01

## 2024-07-03 ENCOUNTER — NON-APPOINTMENT (OUTPATIENT)
Age: 59
End: 2024-07-03

## 2024-07-12 RX ORDER — ONDANSETRON HYDROCHLORIDE 2 MG/ML
4 INJECTION INTRAMUSCULAR; INTRAVENOUS EVERY 6 HOURS
Refills: 0 | Status: DISCONTINUED | OUTPATIENT
Start: 2024-07-16 | End: 2024-07-17

## 2024-07-12 RX ORDER — ASPIRIN 325 MG/1
81 TABLET, FILM COATED ORAL
Refills: 0 | Status: DISCONTINUED | OUTPATIENT
Start: 2024-07-17 | End: 2024-07-17

## 2024-07-12 RX ORDER — OXYCODONE HYDROCHLORIDE 100 MG/5ML
5 SOLUTION ORAL EVERY 4 HOURS
Refills: 0 | Status: DISCONTINUED | OUTPATIENT
Start: 2024-07-16 | End: 2024-07-17

## 2024-07-12 RX ORDER — ACETAMINOPHEN 325 MG
650 TABLET ORAL EVERY 6 HOURS
Refills: 0 | Status: DISCONTINUED | OUTPATIENT
Start: 2024-07-16 | End: 2024-07-17

## 2024-07-12 RX ORDER — DEXTROSE MONOHYDRATE AND SODIUM CHLORIDE 5; .3 G/100ML; G/100ML
1000 INJECTION, SOLUTION INTRAVENOUS
Refills: 0 | Status: DISCONTINUED | OUTPATIENT
Start: 2024-07-16 | End: 2024-07-17

## 2024-07-12 RX ORDER — VALSARTAN 320 MG/1
160 TABLET ORAL DAILY
Refills: 0 | Status: DISCONTINUED | OUTPATIENT
Start: 2024-07-16 | End: 2024-07-17

## 2024-07-12 RX ORDER — OXYCODONE HYDROCHLORIDE 100 MG/5ML
10 SOLUTION ORAL EVERY 4 HOURS
Refills: 0 | Status: DISCONTINUED | OUTPATIENT
Start: 2024-07-16 | End: 2024-07-17

## 2024-07-12 RX ORDER — CELECOXIB 100 MG/1
200 CAPSULE ORAL EVERY 12 HOURS
Refills: 0 | Status: DISCONTINUED | OUTPATIENT
Start: 2024-07-17 | End: 2024-07-17

## 2024-07-12 RX ORDER — PANTOPRAZOLE SODIUM 40 MG/10ML
40 INJECTION, POWDER, FOR SOLUTION INTRAVENOUS
Refills: 0 | Status: DISCONTINUED | OUTPATIENT
Start: 2024-07-16 | End: 2024-07-17

## 2024-07-12 RX ORDER — SENNOSIDES 8.6 MG
2 TABLET ORAL AT BEDTIME
Refills: 0 | Status: DISCONTINUED | OUTPATIENT
Start: 2024-07-16 | End: 2024-07-17

## 2024-07-12 RX ORDER — ACETAMINOPHEN 325 MG
1000 TABLET ORAL ONCE
Refills: 0 | Status: DISCONTINUED | OUTPATIENT
Start: 2024-07-16 | End: 2024-07-17

## 2024-07-12 RX ORDER — DEXAMETHASONE 1 MG/1
10 TABLET ORAL ONCE
Refills: 0 | Status: COMPLETED | OUTPATIENT
Start: 2024-07-17 | End: 2024-07-17

## 2024-07-12 RX ORDER — HYDROMORPHONE HCL 0.2 MG/ML
0.5 INJECTION, SOLUTION INTRAVENOUS
Refills: 0 | Status: DISCONTINUED | OUTPATIENT
Start: 2024-07-16 | End: 2024-07-17

## 2024-07-12 RX ORDER — DULOXETINE HYDROCHLORIDE 20 MG/1
30 CAPSULE, DELAYED RELEASE ORAL DAILY
Refills: 0 | Status: DISCONTINUED | OUTPATIENT
Start: 2024-07-16 | End: 2024-07-17

## 2024-07-12 RX ORDER — POLYETHYLENE GLYCOL 3350 1 G/G
17 POWDER ORAL AT BEDTIME
Refills: 0 | Status: DISCONTINUED | OUTPATIENT
Start: 2024-07-16 | End: 2024-07-17

## 2024-07-16 ENCOUNTER — INPATIENT (INPATIENT)
Facility: HOSPITAL | Age: 59
LOS: 0 days | Discharge: HOME HEALTH | End: 2024-07-17
Attending: STUDENT IN AN ORGANIZED HEALTH CARE EDUCATION/TRAINING PROGRAM | Admitting: STUDENT IN AN ORGANIZED HEALTH CARE EDUCATION/TRAINING PROGRAM
Payer: COMMERCIAL

## 2024-07-16 ENCOUNTER — APPOINTMENT (OUTPATIENT)
Dept: ORTHOPEDIC SURGERY | Facility: HOSPITAL | Age: 59
End: 2024-07-16
Payer: COMMERCIAL

## 2024-07-16 ENCOUNTER — TRANSCRIPTION ENCOUNTER (OUTPATIENT)
Age: 59
End: 2024-07-16

## 2024-07-16 VITALS
OXYGEN SATURATION: 98 % | TEMPERATURE: 99 F | HEIGHT: 67 IN | WEIGHT: 214.95 LBS | SYSTOLIC BLOOD PRESSURE: 132 MMHG | DIASTOLIC BLOOD PRESSURE: 84 MMHG | RESPIRATION RATE: 16 BRPM | HEART RATE: 65 BPM

## 2024-07-16 DIAGNOSIS — Z98.890 OTHER SPECIFIED POSTPROCEDURAL STATES: Chronic | ICD-10-CM

## 2024-07-16 DIAGNOSIS — Z98.84 BARIATRIC SURGERY STATUS: Chronic | ICD-10-CM

## 2024-07-16 LAB
ANION GAP SERPL CALC-SCNC: 3 MMOL/L — LOW (ref 5–17)
BUN SERPL-MCNC: 16 MG/DL — SIGNIFICANT CHANGE UP (ref 7–23)
CALCIUM SERPL-MCNC: 8.9 MG/DL — SIGNIFICANT CHANGE UP (ref 8.5–10.1)
CHLORIDE SERPL-SCNC: 110 MMOL/L — HIGH (ref 96–108)
CO2 SERPL-SCNC: 27 MMOL/L — SIGNIFICANT CHANGE UP (ref 22–31)
CREAT SERPL-MCNC: 0.73 MG/DL — SIGNIFICANT CHANGE UP (ref 0.5–1.3)
EGFR: 95 ML/MIN/1.73M2 — SIGNIFICANT CHANGE UP
GLUCOSE BLDC GLUCOMTR-MCNC: 107 MG/DL — HIGH (ref 70–99)
GLUCOSE SERPL-MCNC: 93 MG/DL — SIGNIFICANT CHANGE UP (ref 70–99)
HCT VFR BLD CALC: 38.1 % — SIGNIFICANT CHANGE UP (ref 34.5–45)
HGB BLD-MCNC: 12.6 G/DL — SIGNIFICANT CHANGE UP (ref 11.5–15.5)
MCHC RBC-ENTMCNC: 29.7 PG — SIGNIFICANT CHANGE UP (ref 27–34)
MCHC RBC-ENTMCNC: 33.1 G/DL — SIGNIFICANT CHANGE UP (ref 32–36)
MCV RBC AUTO: 89.9 FL — SIGNIFICANT CHANGE UP (ref 80–100)
NRBC # BLD: 0 /100 WBCS — SIGNIFICANT CHANGE UP (ref 0–0)
PLATELET # BLD AUTO: 213 K/UL — SIGNIFICANT CHANGE UP (ref 150–400)
POTASSIUM SERPL-MCNC: 4.1 MMOL/L — SIGNIFICANT CHANGE UP (ref 3.5–5.3)
POTASSIUM SERPL-SCNC: 4.1 MMOL/L — SIGNIFICANT CHANGE UP (ref 3.5–5.3)
RBC # BLD: 4.24 M/UL — SIGNIFICANT CHANGE UP (ref 3.8–5.2)
RBC # FLD: 13.1 % — SIGNIFICANT CHANGE UP (ref 10.3–14.5)
SODIUM SERPL-SCNC: 140 MMOL/L — SIGNIFICANT CHANGE UP (ref 135–145)
WBC # BLD: 6.4 K/UL — SIGNIFICANT CHANGE UP (ref 3.8–10.5)
WBC # FLD AUTO: 6.4 K/UL — SIGNIFICANT CHANGE UP (ref 3.8–10.5)

## 2024-07-16 PROCEDURE — 20985 CPTR-ASST DIR MS PX: CPT

## 2024-07-16 PROCEDURE — 27447 TOTAL KNEE ARTHROPLASTY: CPT | Mod: AS,RT

## 2024-07-16 PROCEDURE — 27447 TOTAL KNEE ARTHROPLASTY: CPT | Mod: RT

## 2024-07-16 PROCEDURE — 73560 X-RAY EXAM OF KNEE 1 OR 2: CPT | Mod: 26,RT

## 2024-07-16 DEVICE — MAKO BONE PIN 3.2MM X 110MM: Type: IMPLANTABLE DEVICE | Site: RIGHT | Status: FUNCTIONAL

## 2024-07-16 DEVICE — INSERT TIB BEARING CS X3 SZ 6 9MM: Type: IMPLANTABLE DEVICE | Site: RIGHT | Status: FUNCTIONAL

## 2024-07-16 DEVICE — PATELLA TRIATHLON ASSYM SZ A3X10MM: Type: IMPLANTABLE DEVICE | Site: RIGHT | Status: FUNCTIONAL

## 2024-07-16 DEVICE — BASEPLATE TIB TRIATHLON TRITAN SZ 6: Type: IMPLANTABLE DEVICE | Site: RIGHT | Status: FUNCTIONAL

## 2024-07-16 DEVICE — COMP FEM CRUCIATE RETAINING: Type: IMPLANTABLE DEVICE | Site: RIGHT | Status: FUNCTIONAL

## 2024-07-16 RX ORDER — TETRACAINE HCL 0.5 %
1 DROPS OPHTHALMIC (EYE) ONCE
Refills: 0 | Status: COMPLETED | OUTPATIENT
Start: 2024-07-16 | End: 2024-07-16

## 2024-07-16 RX ORDER — HYDROMORPHONE HCL 0.2 MG/ML
1 INJECTION, SOLUTION INTRAVENOUS
Refills: 0 | Status: DISCONTINUED | OUTPATIENT
Start: 2024-07-16 | End: 2024-07-16

## 2024-07-16 RX ORDER — SODIUM CHLORIDE 0.9 % (FLUSH) 0.9 %
3 SYRINGE (ML) INJECTION EVERY 8 HOURS
Refills: 0 | Status: DISCONTINUED | OUTPATIENT
Start: 2024-07-16 | End: 2024-07-16

## 2024-07-16 RX ORDER — ACETAMINOPHEN 325 MG
650 TABLET ORAL ONCE
Refills: 0 | Status: COMPLETED | OUTPATIENT
Start: 2024-07-16 | End: 2024-07-16

## 2024-07-16 RX ORDER — KETOROLAC TROMETHAMINE 5 MG/ML
1 SOLUTION OPHTHALMIC EVERY 8 HOURS
Refills: 0 | Status: COMPLETED | OUTPATIENT
Start: 2024-07-16 | End: 2024-07-17

## 2024-07-16 RX ORDER — CEFAZOLIN 10 G/1
2000 INJECTION, POWDER, FOR SOLUTION INTRAVENOUS EVERY 8 HOURS
Refills: 0 | Status: COMPLETED | OUTPATIENT
Start: 2024-07-16 | End: 2024-07-17

## 2024-07-16 RX ORDER — CELECOXIB 100 MG/1
200 CAPSULE ORAL ONCE
Refills: 0 | Status: COMPLETED | OUTPATIENT
Start: 2024-07-16 | End: 2024-07-16

## 2024-07-16 RX ORDER — FENTANYL CITRATE 50 UG/ML
25 INJECTION, SOLUTION INTRAMUSCULAR; INTRAVENOUS
Refills: 0 | Status: DISCONTINUED | OUTPATIENT
Start: 2024-07-16 | End: 2024-07-16

## 2024-07-16 RX ORDER — DULOXETINE HYDROCHLORIDE 20 MG/1
1 CAPSULE, DELAYED RELEASE ORAL
Refills: 0 | DISCHARGE

## 2024-07-16 RX ORDER — ONDANSETRON HYDROCHLORIDE 2 MG/ML
4 INJECTION INTRAMUSCULAR; INTRAVENOUS ONCE
Refills: 0 | Status: DISCONTINUED | OUTPATIENT
Start: 2024-07-16 | End: 2024-07-16

## 2024-07-16 RX ORDER — DEXTROSE MONOHYDRATE AND SODIUM CHLORIDE 5; .3 G/100ML; G/100ML
1000 INJECTION, SOLUTION INTRAVENOUS
Refills: 0 | Status: DISCONTINUED | OUTPATIENT
Start: 2024-07-16 | End: 2024-07-16

## 2024-07-16 RX ORDER — VALSARTAN 320 MG/1
0 TABLET ORAL
Refills: 0 | DISCHARGE

## 2024-07-16 RX ADMIN — Medication 2 TABLET(S): at 22:17

## 2024-07-16 RX ADMIN — CELECOXIB 200 MILLIGRAM(S): 100 CAPSULE ORAL at 10:00

## 2024-07-16 RX ADMIN — DEXTROSE MONOHYDRATE AND SODIUM CHLORIDE 75 MILLILITER(S): 5; .3 INJECTION, SOLUTION INTRAVENOUS at 13:33

## 2024-07-16 RX ADMIN — Medication 1 DROP(S): at 18:59

## 2024-07-16 RX ADMIN — CEFAZOLIN 100 MILLIGRAM(S): 10 INJECTION, POWDER, FOR SOLUTION INTRAVENOUS at 19:26

## 2024-07-16 RX ADMIN — Medication 650 MILLIGRAM(S): at 17:05

## 2024-07-16 RX ADMIN — DULOXETINE HYDROCHLORIDE 30 MILLIGRAM(S): 20 CAPSULE, DELAYED RELEASE ORAL at 17:04

## 2024-07-16 RX ADMIN — Medication 650 MILLIGRAM(S): at 18:05

## 2024-07-16 RX ADMIN — KETOROLAC TROMETHAMINE 1 DROP(S): 5 SOLUTION OPHTHALMIC at 22:19

## 2024-07-16 RX ADMIN — OXYCODONE HYDROCHLORIDE 10 MILLIGRAM(S): 100 SOLUTION ORAL at 18:10

## 2024-07-16 RX ADMIN — Medication 500 MILLIGRAM(S): at 17:05

## 2024-07-16 RX ADMIN — OXYCODONE HYDROCHLORIDE 10 MILLIGRAM(S): 100 SOLUTION ORAL at 17:10

## 2024-07-16 RX ADMIN — POLYETHYLENE GLYCOL 3350 17 GRAM(S): 1 POWDER ORAL at 22:17

## 2024-07-16 RX ADMIN — Medication 650 MILLIGRAM(S): at 10:00

## 2024-07-16 NOTE — PHYSICAL THERAPY INITIAL EVALUATION ADULT - TRANSFER TRAINING, PT EVAL
Pt will be able to perform sit to stand, stand pivot transfer using [RW] and maintaining WB precautions independently in 2 weeks to improve functional transfers between any 2 surfaces

## 2024-07-16 NOTE — PHYSICAL THERAPY INITIAL EVALUATION ADULT - ACTIVE RANGE OF MOTION EXAMINATION, REHAB EVAL
except R knee 40 deg flexion/bilateral  lower extremity Active ROM was WFL (within functional limits) except R knee 70 deg flexion, extension -5 degrees/bilateral  lower extremity Active ROM was WFL (within functional limits)

## 2024-07-16 NOTE — DISCHARGE NOTE PROVIDER - HOSPITAL COURSE
59yFemale with history of Right knee OA presenting for R TKA by Dr. Palomino on 7/16/24. Risk and benefits of surgery were explained to the patient. The patient understood and agreed to proceed with surgery. Patient underwent the procedure with no intraoperative complications. Pt was brought in stable condition to the PACU. Once stable in PACU, pt was brought to the floor. During hospital stay pt was followed by Medicine, physical therapy, Home Care during this admission. Pt is stable for discharge

## 2024-07-16 NOTE — PHYSICAL THERAPY INITIAL EVALUATION ADULT - ASSISTIVE DEVICE FOR STAIR TRANSFER, REHAB EVAL
ascending using LHR and cane in right hand, descending using RHR and cane in left hand/straight cane/left rail up/right rail down

## 2024-07-16 NOTE — PHYSICAL THERAPY INITIAL EVALUATION ADULT - ADDITIONAL COMMENTS
As per preop eval,  pt lives with spouse and adult children in a private house with 6 entry steps equipped with bilateral hand rails that cannot be reached simultaneously . Once inside, pt has to negotiate a flight of stairs, with 12 steps , left ascending handrail, to access the bedroom and bathroom. Pt plans to recuperate on the 1st floor, where there is a bedroom and bathroom. This  bathroom has a stall shower, fixed shower head , standard toilet with adequate space to fit a commode over it. Pt is independent in all aspects of functional mobility and ADL's, uses no AD.

## 2024-07-16 NOTE — OCCUPATIONAL THERAPY INITIAL EVALUATION ADULT - ADDITIONAL COMMENTS
Lives With spouse; and adult children in a private house with 6 entry steps equipped with bilateral hand rails that cannot be reached simultaneously . Once inside, pt has to negotiate a flight of stairs, with 12 steps , left ascending handrail, to access the bedroom and bathroom. Pt plans to recuperate on the 1st floor, where there is a bedroom and bathroom. This  bathroom has a stall shower, fixed shower head , standard toilet with adequate space to fit a commode over it.

## 2024-07-16 NOTE — CONSULT NOTE ADULT - SUBJECTIVE AND OBJECTIVE BOX
BRENT CAMPOVERDE is a 59y Female s/p ROBOTIC ASSISTED RIGHT TOTAL KNEE ARTHROPLASTY WITH JASWINDER      w/ h/o HTN (hypertension)    Vertigo    Depression      denies any chest pain shortness of breath palpitation dizziness lightheadedness nausea vomiting fever or chills    History of ankle surgery    H/O laparoscopic adjustable gastric banding        SH: doesnot smoke or drink at this time    No Known Allergies    acetaminophen     Tablet .. 650 milliGRAM(s) Oral every 6 hours  acetaminophen   IVPB .. 1000 milliGRAM(s) IV Intermittent once  ascorbic acid 500 milliGRAM(s) Oral two times a day  ceFAZolin   IVPB 2000 milliGRAM(s) IV Intermittent every 8 hours  DULoxetine 30 milliGRAM(s) Oral daily  HYDROmorphone  Injectable 0.5 milliGRAM(s) IV Push every 3 hours PRN  ketorolac 0.5% Ophthalmic Solution 1 Drop(s) Left EYE every 8 hours  lactated ringers. 1000 milliLiter(s) IV Continuous <Continuous>  multivitamin 1 Tablet(s) Oral daily  ondansetron Injectable 4 milliGRAM(s) IV Push every 6 hours PRN  oxyCODONE    IR 10 milliGRAM(s) Oral every 4 hours PRN  oxyCODONE    IR 5 milliGRAM(s) Oral every 4 hours PRN  pantoprazole    Tablet 40 milliGRAM(s) Oral before breakfast  polyethylene glycol 3350 17 Gram(s) Oral at bedtime  senna 2 Tablet(s) Oral at bedtime  valsartan 160 milliGRAM(s) Oral daily    T(C): 36.4 (07-16-24 @ 18:37), Max: 37 (07-16-24 @ 09:35)  HR: 67 (07-16-24 @ 18:37) (48 - 67)  BP: 105/73 (07-16-24 @ 18:37) (98/67 - 141/87)  RR: 18 (07-16-24 @ 17:30) (12 - 18)  SpO2: 98% (07-16-24 @ 18:37) (98% - 100%)  HEENT unremarkable  neck no JVD or bruit  heart normal S1 S2 RRR no gallops or rubs  chest clear to auscultation  abd sof nontender non distended +bs  ext no calf tenderness    A/P   DVT PX  pain control  bowel regimen   wound care as per ortho  GI PX  antiemetics prn  incentive spirometer

## 2024-07-16 NOTE — DISCHARGE NOTE PROVIDER - NSDCFUADDAPPT_GEN_ALL_CORE_FT
Follow up with your surgeon in two weeks. Call for appointment.  If you need more pain medication, call your surgeon's office. For medication refills or authorizations, please call [phone]  We recommend that you call and schedule a follow up appointment within 2-4 weeks with your primary care physician for repeat blood work (CBC and BMP) for post hospital discharge follow-up care.  Call your surgeon if you have increased redness/pain/drainage or fever. Return to ER for shortness of breath/calf tenderness.

## 2024-07-16 NOTE — PHYSICAL THERAPY INITIAL EVALUATION ADULT - REFERRING PHYSICIAN, REHAB EVAL
From: Atif Freeman  To: Jonn Larose MD  Sent: 12/30/2018 6:38 PM CST  Subject: Shireen Pizarro broke a bone in his big toe on his right foot playing indoor soccer at school. They asked for him to schedule a follow up sometime January 8th to 15th. I am not sure who he should see. I would assume imaging and then who? He does have the images from the initial visit in [place]. Please let me know. If you need to speak with me, my cell is [phone].   Dr. Palomino Libtayo Pregnancy And Lactation Text: This medication is contraindicated in pregnancy and when breast feeding.

## 2024-07-16 NOTE — PHYSICAL THERAPY INITIAL EVALUATION ADULT - RANGE OF MOTION, PT EVAL
Pt will improve R knee ROM to 120 deg flexion and full extension in 2 weeks to improve functional mobility.

## 2024-07-16 NOTE — PHYSICAL THERAPY INITIAL EVALUATION ADULT - NSPTDMEREC_GEN_A_CORE
rolling walker/straight cane/toileting Pt will require a rolling walker and 3:1 commode  to perform MRADL's safely at home.

## 2024-07-16 NOTE — PATIENT PROFILE ADULT - VISION (WITH CORRECTIVE LENSES IF THE PATIENT USUALLY WEARS THEM):
Partially impaired: cannot see medication labels or newsprint, but can see obstacles in path, and the surrounding layout; can count fingers at arm's length Tarsorrhaphy Text: A tarsorrhaphy was performed using Frost sutures.

## 2024-07-16 NOTE — PHYSICAL THERAPY INITIAL EVALUATION ADULT - GAIT DEVIATIONS NOTED, PT EVAL
decreased eliazar/increased time in double stance/decreased step length/decreased weight-shifting ability

## 2024-07-16 NOTE — OCCUPATIONAL THERAPY INITIAL EVALUATION ADULT - GENERAL OBSERVATIONS, REHAB EVAL
Pt encountered semi supine in bed, NAD, all lines intact, pt reported pain 5/10 s/p Right TKA, pt agreeable to OT eval.

## 2024-07-16 NOTE — PATIENT PROFILE ADULT - HAVE YOU EXPERIENCED VIOLENCE OR A TRAUMATIC EVENT?
CARDIOLOGY FOLLOW UP   DATE:  3/23/2023    PCP: Fabio Castellano MD    HISTORY:  Adal Fritz is a 65 year old male with PFO closure on 9/23/22 and Aortic valve insufficiency now s/p AVR on 11/8/22.     ASSESSMENT/PLAN:  1. Hx of moderate eccentric aortic valve regurgitation with a mildly abnormal appearing trileaflet aortic valve. Sclerotic/thickened noncoronary cusp greater than left, and right coronary cusp with decreased central coaptation.    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  AVR (#27 mm Andrea Inspiris Resilia bioprosthetic aortic valve) 11/8/22 with Dr. Ruiz. (minimally invasive).   - Previously no signs of volume overload.  No CHF , no dilator dysfunctional left ventricle and no exercise intolerance.  Previous is seen at [facility] years ago and they recommended observation.  Mildly dilated aortic root, 3.8 cm. Upper normal size proximal ascending aorta.  - Echo 1/06/22:  Mild left ventricular hypertrophy. Normal left ventricular systolic function.  Left ventricular ejection fraction, 64%. Normal diastolic function.  Normal right ventricular size and systolic function. Normal right ventricular systolic pressure.  Moderate biatrial enlargement.   Mild to moderate eccentric aortic valve regurgitation.  Compared to the prior study from 12/17/2020, there have been no significant changes.  - AJAY 08/16/2022:  Trileaflet aortic valve. Right cusp is perforated at the base. Severe aortic valve regurgitation.  Patent foramen ovale (PFO) with large bidirectional shunting by color flow and agitated saline.  - New diastolic murmur.  Very mildly reduced exercise tolerance over last few months.  - I feel that aortic valve replacement is indicated at this time, or within the near future.  - 9/2022 cardiac catheterization and PFO closure with Dr. Diehl.   Normal coronary arteries.   - AVR (#27 mm Andrea Inspiris Resilia bioprosthetic aortic valve)  11/8/22 with Dr. Ruiz(minimally invasive).   - Echo 3/9/23:    Normal left ventricular size with upper normal wall thickness.  Low normal global LV systolic function with no regional wall motion abnormalities.  Left ventricular ejection fraction; 54 %. Mildly reduced LV global longitudinal strain, -15.4%.  Mild biatrial and right ventricular enlargement, with low normal RV systolic function.  30 mm Amplatzer PFO closure device intact.  Agitated saline was injected through a peripheral vein and did not show evidence of a shunt.  Color-flow Doppler negative for residual atrial septal shunting.  S/p 27 mm Andrea Inspris Resilia bioprosthetic aortic valve. Normal w/out stenosis or regurgitation. Mean grad 8 mmHg.  Minimally sclerotic mitral valve leaflets without stenosis. Trace MV, TV and PV regurgitation.  Normal estimated pulmonary artery systolic pressure; 16 mmHg.  Mildly dilated sinus of Valsalva, 3.8 cm. Mildly dilated proximal ascending aorta, 3.7 cm.  - Anticoagulated with warfarin for 3 months per surgery.  Brief atrial fibrillation postop.  February 2023- 4 day event monitor negative for atrial fibrillation.  Neurology felt that he could stop his warfarin as long as he is being monitored for recurrent paroxysmal atrial fibrillation by a LINQ monitor or Apple watch. He chose Apple Watch. He does wear it overnight. No A Fib notifications.  - Echo in 12 months.   2. Hyperdynamic atrial septal  flap with associated PFO.  Large bidirectional shunting by color-flow Doppler and agitated saline.  CVA of uncertain etiology 8/14/2022.  PFO closure (Amplatzer device) with Dr. Diehl on 9/23/22.  - CVA of uncertain etiology 08/14/2022.  - AJAY 8/16/2022:  Patent foramen ovale (PFO) with large bidirectional shunting by color flow and agitated saline.  - PFO closure (Amplatzer device) with Dr. Diehl on 9/23/22.   - Limited Echo 9/24/22:   No pericardial effusion.  SP PFO closure with a 30 mm Amplatzer device (09/23/22).  Device is well seated. Small amount of color flow visualized at the inferior  portion of the device.  Compared to prior study, 8/15/2022, pt is sp PFO closure.  - Echo 3/9/23:    Normal left ventricular size with upper normal wall thickness.  Low normal global LV systolic function with no regional wall motion abnormalities.  Left ventricular ejection fraction; 54 %. Mildly reduced LV global longitudinal strain, -15.4%.  Mild biatrial and right ventricular enlargement, with low normal RV systolic function.  30 mm Amplatzer PFO closure device intact.  Agitated saline was injected through a peripheral vein and did not show evidence of a shunt.  Color-flow Doppler negative for residual atrial septal shunting.  S/p 27 mm Andrea Inspris Resilia bioprosthetic aortic valve. Normal w/out stenosis or regurgitation. Mean grad 8 mmHg.  Minimally sclerotic mitral valve leaflets without stenosis. Trace MV, TV and PV regurgitation.  Normal estimated pulmonary artery systolic pressure; 16 mmHg.  Mildly dilated sinus of Valsalva, 3.8 cm. Mildly dilated proximal ascending aorta, 3.7 cm.  - Follow up Echo in 12 months.  3. Status post acute ischemic stroke, right MCA, status post tPA on 08/14/2022.  - Presented with left sided shunt and facial droop. No bleed. Received tPA wih complete clearing of symptoms. Negative work up to date except known atrial septal aneurysm w/PFO.   - Negative event monitor for atrial fibrillation, August 2022.  Brief atrial fibrillation post valve surgery.  - PFO closure with Dr. Diehl on 9/23/22.   - Was on Coumadin, ASA and Statin.  Negative event monitor.  Warfarin stopped.  Monitor for recurrent atrial fibrillation.    - Follow-up with vascular surgery regarding dilated leg veins.  4. Family history of aortic aneurysm and probable aortic valve disease.   - CT angiogram negative for aneurysm.   5. Hx of suddenly waking up at 3:00 a.m. with history of some snoring and feeling of anxiety.   - This hasn't  changed since last visit, no better no worse.  - Patient had home sleep study that was negative.  6. Mildly abnormal pulmonary finding on calcium heart scan  - \"Nodular groundglass opacities within the left lower lobe may be  infectious/inflammatory or due to aspiration. Radiographic follow-up  Recommended\".  Discussed with patient.  Patient discussed with PCP, who felt this may have been asymptomatic COVID.  - Asymptomatic.  - Follow-up with PCP.   7. Post-op atrial fibrillation.  Occasional PVCs in rehab.  - Event Monitor 3/9/23:  1.  The rhythm was sinus throughout with heart rates ranging between 51 BPM, which is sinus bradycardia, to a maximum 165 BPM, which is sinus tachycardia 02/19/2023 at 11:18 a.m., with an average heart rate of 78 BPM.   2.  No evidence for atrial fibrillation or atrial flutter.   3.  No significant pauses or significant heart blocks.   4.  Occasional PACs at a 2% burden.  Rare PVCs of a less than 1% burden.  No runs of SVT or ventricular tachycardia.   5.  No symptomatic transmissions.  Auto triggered events revealed sinus rhythm with occasional PACs and PVCs.  Also, auto-triggered sinus tachycardia at max rate of 165 BPM.   - Noted post AVR, resolved with increased metoprolol and amiodarone started.   - Was anticoagulated with warfarin.  Amiodarone off.  Warfarin now off.  - On metoprolol.  I would favor long-term low-dose beta-blocker.  - Monitor for recurrent atrial fibrillation with Apple watch.    I favor max heart rate with exercise 165 bpm.  No heavy weight lifting, however can do repetitions with lighter weights, between 40-60 lb.    RECOMMEND:  Continue current meds.   Okay to not continue cardiac rehab, however I do recommend 1 last visit. Patient agrees.  I would favor low-dose metoprolol long-term, if tolerated.  Apple watch to monitor heart rhythm.  Max HR to 165 bpm with exercise.  Vascular surgeon follow-up scheduled on 6/8/23 with Dr. Joce Pham.   Echo in 12 months.    RTC 6 months.  ____________________________________________________________________  SUBJECTIVE:  Adal Fritz 65 year old male here for a 3 month follow. Today he states that he feels \"Perfect\".    Cardiac Rehab has not been able to get a hold of him. Pt states that he feels fine and would like to discontinue cardiac rehab at this time.    He states that he is monitoring his heart rate with his apple watch.     BLE edema unchanged. He states that he occasionally wears compression stocking.     Denies Chest pain, Stroke symptoms, SOB, Dizziness, Palpitations, Syncope, PND, Orthopnea, Ankle Edema, Claudication and Fatigue    Recent Hospitalizations: None   Exercise: treadmill 5 days a week for 30 + minutes and walks.   Sleep: Not Bad     Appetite: \"Too good\"  Caffeine: 2 cups of coffee a day  ETOH: twice a week   Smoking: Never     PHYSICAL EXAM:  Visit Vitals  BP 98/62 (BP Location: RUE - Right upper extremity, Patient Position: Sitting, Cuff Size: Regular)   Pulse 80   Resp 16   Ht 6' 1\" (1.854 m)   Wt 89.3 kg (196 lb 12.8 oz)   SpO2 99%   BMI 25.96 kg/m²      Neck: Supple, No JVD. Carotid pulses 2+  Lungs: Resp effort good, Clear throughout  Heart: PMI normal. RRR, normal S1, S2, without S3, w/grade I/VI VINH at L/RUSB, LSB/LLSB.  Abd: Pos Bowel sounds, soft, nontender  Ext: Trace BLE edema, 2+ PT/DP pulses  Neuro:  No neural deficits noted.  Right upper and lower chest incisions well healed.     DIAGNOSTIC LABS/DATA:    Echo 3/9/23:   Normal left ventricular size with upper normal wall thickness.  Low normal global LV systolic function with no regional wall motion abnormalities.  Left ventricular ejection fraction; 54 %. Mildly reduced LV global longitudinal strain, -15.4%.  Mildly enlarged right ventricle, with low normal RV systolic function.  Mildly enlarged left atrium.  Mildly enlarged right atrium.  30 mm Amplatzer PFO closure device intact.  Agitated saline was injected through a peripheral vein and did  not show evidence of a shunt.  Color-flow Doppler negative for residual atrial septal shunting.  S/p 27 mm Andrea Inspris Resilia bioprosthetic aortic valve which appears normal w/out stenosis or regurgitation. AV mean  gradient 8 mmHg.  Minimally sclerotic mitral valve leaflets without stenosis. Trace MV regurgitation.  Trivial tricuspid valve regurgitation.  Trivial pulmonary valve regurgitation.  Normal estimated pulmonary artery systolic pressure; 16 mmHg.  Mildly dilated sinus of Valsalva, 3.8 cm. Mildly dilated proximal ascending aorta, 3.7 cm.    Event Monitor 2/16/23:  1.  The rhythm was sinus throughout with heart rates ranging between 51 BPM, which is sinus bradycardia, to a maximum 165 BPM, which is sinus tachycardia 02/19/2023 at 11:18 a.m., with an average heart rate of 78 BPM.   2.  No evidence for atrial fibrillation or atrial flutter.   3.  No significant pauses or significant heart blocks.   4.  Occasional PACs at a 2% burden.  Rare PVCs of a less than 1% burden.  No runs of SVT or ventricular tachycardia.   5.  No symptomatic transmissions.  Auto triggered events revealed sinus rhythm with occasional PACs and PVCs.  Also, auto-triggered sinus tachycardia at max rate of 165 BPM.     EKG 11/11/22:   Atrial fibrillation with rapid ventricular response  Moderate voltage criteria for LVH, may be normal variant  diffuse ST elevation in inferior, anterior and lateral leads  consider perocarditis  Abnormal ECG      Echo AJAY 11/8/22:   Aortic Valve: Annulus dilated.   Stenosis none.   Regurgitation severe.      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 Mild central jet present as well.   Tricuspid Valve: Annulus normal.   Stenosis not present.   Regurgitation mild.     Ascending Aorta: Size normal.  Dissection not present.  Plaque thickness less than 3mm.     Mobile plaque not present.     Aortic Arch: Size normal.  Dissection not present.  Plaque thickness less  than 3 mm.     Mobile plaque not present.     Descending Aorta: Size normal.  Dissection not present.  Plaque thickness less than 3 mm.     Mobile plaque not present.      Pleural Effusion:  none   Post Intervention Findings: Low-normal LV and RV function. Size 27 AV in   place with no regurg. Gradient across valve is 5 mm Hg. No other new   valvular pathology. No aortic dissection.     EKG 10/28/22:   Normal sinus rhythm  Normal ECG  When compared with ECG of 15-AUG-2022 00:43,  No significant change was found      Event Monitor 9/21/22:   Patient monitored for 28d 7h 58m   The underlying rhythm is normal sinus rhythm with rates from 50 to 120 bpm and an average rate of 71 bpm.   PVCs: <1%   SVEs: 1%   Pauses: none   AF: none   Patient triggered 0 events     Echo Limited 9/24/22:   No pericardial effusion.  SP PFO closure with a 30 mm Amplatzer device (09/23/22). Device is well seated. Small amount of color flow visualized at the inferior  portion of the device.  Compared to prior study, 8/15/2022, pt is sp PFO closure.    Cardiac Cath 9/22/22:   1. Right-dominant coronary anatomy with  Type 3 LAD  2. Angiographically normal epicardial coronary arteries.    3. Minimally elevated left-sided filling pressures, LVEDP 16 mmHg.    4. No peak-to-peak gradient across the aortic valve   No significant epicardial coronary disease    Echo AJAY 8/16/22:  Trileaflet aortic valve. Right cusp is perforated at the base. Severe aortic valve regurgitation.  Patent foramen ovale (PFO) with large bidirectional shunting by color flow and agitated saline.    Echo 8/15/22:  Interatrial septal aneurysm with moderate right to left shunting demonstrated by agitated saline at rest and with valsalva.  Mildly enlarged left ventricular chamber size. Normal left ventricular systolic function.LVEF= 64 %.  Grade II left ventricular diastolic dysfunction.  Normal right ventricular chamber size. Normal right ventricular systolic function.  At least  moderate, eccentric aortic valve regurgitation originating from in between NCC/RCC leaflets.  No pericardial effusion.  Compared to prior study, 01/2022, there is no significant change.  Consider further evaluation of interatrial shunt and aortic valve/AI with AJAY.    EKG 8/15/22:    Normal sinus rhythm   Moderate voltage criteria for LVH, may be normal variant   Borderline ECG   When compared with ECG of   14-AUG-2022 23:45,   No significant change was found     Echo 1/6/22:  Mild left ventricular hypertrophy.  Normal left ventricular systolic function.  Left ventricular ejection fraction, 64%.  Normal diastolic function.  Normal right ventricular size and systolic function.  Normal right ventricular systolic pressure.  Moderate biatrial enlargement. Mild to moderate eccentric aortic valve regurgitation.  Compared to the prior study from 12/17/2020, there have been no significant changes.    Echo 12/17/20:  Upper normal left ventricular cavity size. Mildly increased left ventricular wall thickness.  Normal left ventricular systolic function. No regional wall motion abnormalities.  Left ventricular ejection fraction, 61 %. LV Global longitudinal strain 22%.  Mild to moderate left atrial enlargement. Mild right atrial enlargement.  Upper normal right ventricular size. Normal right ventricular systolic function.  Mildly thickened trileaflet aortic valve without stenosis.  Eccentric aortic valve regurgitation, up to moderate.  Mild mitral valve leaflet thickening without stenosis or prolapse.  Trace MV and TV regurgitation. Mild TV regurgitation.  Normal estimated pulmonary artery systolic pressure 26 mmHg.  Mildly dilated aortic root, 3.8 cm. Upper normal size proximal ascending aorta.  Upper normal size IVC.  Interatrial septum bowing back and forth between atrium.  Agitated saline was injected through a peripheral vein and did show evidence of a right to left shunt.  Findings consistent with a patent foramen  ovale.  Normal QP/QS shunt ratio of 1.1.  Compared with 01/16/2020:  Aortic valve regurgitation similar to minimally worse.  Bubble study performed on today's study revealing right-to-left  shunt consistent with PFO. Previously not performed.  Recommend follow up echocardiogram in 1 year.    CT Calcium Score 12/17/20:  1.  Total coronary artery calcium score is 0. 0% of people matched for age,  gender, and race/ethnicity who are free of clinical cardiovascular disease  and treated diabetes had less calcium than was detected in this study.  2.  Suspected gastroesophageal reflux.  3.  Nodular groundglass opacities within the left lower lobe may be  infectious/inflammatory or due to aspiration. Radiographic follow-up  recommended.    ECHO: 1/16/2020  Upper normal left ventricular size and wall thickness.  Normal left ventricular systolic function. No regional wall motion abnormalities.  Left ventricular ejection fraction, 61 %. Global longitudinal strain -19 %.  Mild-moderate left atrial enlargement.  Upper normal right atrial and right ventricular size. Normal right ventricular systolic function.  Structurally normal trileaflet aortic valve. No aortic valve stenosis.  Mild-moderate, perhaps slightly greater, eccentric aortic valve regurgitation.  Structurally normal mitral valve.  Trace MV and PV regurgitation. Trace-mild TV regurgitation.  Unable to estimate PA pressure.  Interatrial septal aneurysm bowing back and forth.  Compared with 4/12/2018:  Aortic valve regurgitation appears minimally greater, however still not significant.    Outside Lipids 3/18/21:  Chol 201, , HDL 57    Recent Labs   Lab 02/09/23  0830 01/26/23  0910 01/19/23  1056 01/05/23  1053 12/22/22  0911 11/14/22  1046 11/11/22  0541 11/10/22  1921 11/10/22  0520 11/09/22  0354 11/08/22  1102 10/27/22  1342 09/24/22  0428 09/22/22  1903 08/16/22  0338 08/15/22  0346 08/14/22  2356 08/14/22  2343   HGB  --   --   --   --   --   --  10.8*  --   10.9* 10.2* 10.6* 13.1 12.6* 13.2 12.3* 13.0  --  12.5*   BUN  --   --   --   --   --   --   --   --  17  --   --  16 15 14 13 16  --  20   Creatinine  --   --   --   --   --   --   --   --  0.82  --   --  1.01 0.90 1.01 0.85 0.85   < > 1.03   Hemoglobin A1C  --   --   --   --   --   --   --   --   --   --   --  5.1  --   --   --   --   --  5.2   Potassium  --   --   --   --   --   --   --  4.1 4.1 4.4 3.9 4.5 4.1 4.0 4.0 3.6  --  4.0   Cholesterol  --   --   --   --   --   --   --   --   --   --   --   --   --   --   --  176  --   --    HDL  --   --   --   --   --   --   --   --   --   --   --   --   --   --   --  60  --   --    Cholesterol/ HDL Ratio  --   --   --   --   --   --   --   --   --   --   --   --   --   --   --  2.9  --   --    Triglycerides  --   --   --   --   --   --   --   --   --   --   --   --   --   --   --  59  --   --    CALCLDL  --   --   --   --   --   --   --   --   --   --   --   --   --   --   --  104  --   --    GOT/AST  --   --   --   --   --   --   --   --   --   --   --  44*  --   --   --   --   --  26   GPT  --   --   --   --   --   --   --   --   --   --   --  86*  --   --   --   --   --  26   INR 2.0 2.0 1.7 1.7 1.7   < > 1.0  --   --  1.1 1.2 1.1  --   --   --   --   --  1.1   TSH  --   --   --   --   --   --   --   --   --   --   --  1.420  --   --   --   --   --   --     < > = values in this interval not displayed.       ALLERGIES:  Patient has no known allergies.    Current Outpatient Medications   Medication Sig Dispense Refill   • atorvastatin (LIPITOR) 40 MG tablet Take 1 tablet by mouth nightly. 90 tablet 3   • metoPROLOL succinate (TOPROL-XL) 25 MG 24 hr tablet Take 1 tablet by mouth daily. 90 tablet 3   • Flowflex COVID-19 Ag Home Test Kit FOLLOW INSTRUCTIONS INCLUDED WITH THE PACKAGE.     • amoxicillin (AMOXIL) 500 MG capsule Take 4 capsules by mouth as needed (1 hour prior to dental work and cleaning). 4 capsule 3   • aspirin (Aspirin Childrens) 81 MG chewable tablet  Chew 1 tablet by mouth daily. 90 tablet 3     No current facility-administered medications for this visit.     On 3/23/2023, I, Shawna Castellano MA scribed the services personally performed by Maciej Hickman MD     I have seen, examined, and have evaluated this patient.  Please see above notes by ÓSCAR carrizales that I have reviewed and modified.   I attest that I performed the work for this encounter and agree with the above documentation completed by the scribe.   I spent 60 minutes reviewing chart, speaking with and examining patient, formulating note including assessment and plan.    Maciej Hickman MD, FACC  [facility]       no

## 2024-07-16 NOTE — PHYSICAL THERAPY INITIAL EVALUATION ADULT - PLANNED THERAPY INTERVENTIONS, PT EVAL
Pt will be able to negotiate 1 flight of steps using left handrail up/ right hand rail down and straight cane independently without LOB in 2 weeks/balance training/bed mobility training/gait training/ROM/strengthening/transfer training

## 2024-07-16 NOTE — DISCHARGE NOTE PROVIDER - NSDCFUADDINST_GEN_ALL_CORE_FT
Keep knee straight while at rest. Elevate the leg as much as possible ("toes above the nose") to help control swelling. Make sure you get up and take a brief walk every two hours to help with circulation and prevent stiffness. Incentive spirometer 10X/hour. Cryocuff to help with pain/inflammation.  Keep SAUMYA Dressing Clean, Dry and Intact. May shower with SAUMYA Dressing. Please do not scrub, soak, peel or pick at the SAUMYA dressing. No creams, lotions, or oils over dressing. May shower and let water run over dressing, no baths. Pat dry once out of shower. Dressing to be removed in 7 days. Discard dressing and battery in garbage. If dressing is saturated from border to border - may remove and replace with clean dry dressing.  Shower instructions for SAUMYA Dressing: Place battery pack in a water sealed bag (such as a Ziplock bag) and keep battery out of the water.   Alternately you can turn battery pack off (press orange button.) Twist tubing OFF battery pack before entering shower. Once done with showering. Pat dressing dry. Reconnect tubing and twist ON battery pack after you are dry. Then turn battery pack on (press orange button.)

## 2024-07-16 NOTE — DISCHARGE NOTE PROVIDER - NSDCMRMEDTOKEN_GEN_ALL_CORE_FT
DULoxetine 30 mg oral delayed release capsule: 1 cap(s) orally once a day  mupirocin 2% topical ointment: Apply topically to affected area 2 times a day  valsartan 160 mg oral capsule: orally once a day   ascorbic acid 500 mg oral tablet: 1 tab(s) orally 2 times a day  Aspirin Low Dose 81 mg oral delayed release tablet: 1 tab(s) orally 2 times a day MDD: 2  celecoxib 200 mg oral capsule: 1 cap(s) orally every 12 hours  DULoxetine 30 mg oral delayed release capsule: 1 cap(s) orally once a day  Multiple Vitamins oral tablet: 1 tab(s) orally once a day  Narcan 4 mg/0.1 mL nasal spray: 4 milligram(s) intranasally once , repeat as necessary.   As needed. For suspected opiate overdose   Follow instructions on packet MDD: 0.2 ml  oxyCODONE 5 mg oral tablet: 1 tab(s) orally every 4 hours as needed for  pain 1 tab for mild/moderate pain, 2 tabs for severe pain MDD: 6  pantoprazole 40 mg oral delayed release tablet: 1 tab(s) orally once a day (before a meal) MDD: 1  valsartan 160 mg oral capsule: orally once a day

## 2024-07-16 NOTE — PHYSICAL THERAPY INITIAL EVALUATION ADULT - GAIT TRAINING, PT EVAL
3
Pt will be able to ambulate 350 feet using [RW] and maintaining WB precautions independently in 2 weeks to improve functional mobility

## 2024-07-16 NOTE — DISCHARGE NOTE PROVIDER - NSDCFUSCHEDAPPT_GEN_ALL_CORE_FT
Danny Palomino  [facility]  [address]  Scheduled Appointment: 07/31/2024

## 2024-07-17 ENCOUNTER — TRANSCRIPTION ENCOUNTER (OUTPATIENT)
Age: 59
End: 2024-07-17

## 2024-07-17 VITALS
OXYGEN SATURATION: 98 % | HEART RATE: 64 BPM | DIASTOLIC BLOOD PRESSURE: 86 MMHG | RESPIRATION RATE: 15 BRPM | TEMPERATURE: 98 F | SYSTOLIC BLOOD PRESSURE: 150 MMHG

## 2024-07-17 LAB
ANION GAP SERPL CALC-SCNC: 7 MMOL/L — SIGNIFICANT CHANGE UP (ref 5–17)
BUN SERPL-MCNC: 19 MG/DL — SIGNIFICANT CHANGE UP (ref 7–23)
CALCIUM SERPL-MCNC: 8.5 MG/DL — SIGNIFICANT CHANGE UP (ref 8.5–10.1)
CHLORIDE SERPL-SCNC: 109 MMOL/L — HIGH (ref 96–108)
CO2 SERPL-SCNC: 25 MMOL/L — SIGNIFICANT CHANGE UP (ref 22–31)
CREAT SERPL-MCNC: 0.65 MG/DL — SIGNIFICANT CHANGE UP (ref 0.5–1.3)
EGFR: 102 ML/MIN/1.73M2 — SIGNIFICANT CHANGE UP
GLUCOSE SERPL-MCNC: 100 MG/DL — HIGH (ref 70–99)
HCT VFR BLD CALC: 33.8 % — LOW (ref 34.5–45)
HGB BLD-MCNC: 11.2 G/DL — LOW (ref 11.5–15.5)
MCHC RBC-ENTMCNC: 29.6 PG — SIGNIFICANT CHANGE UP (ref 27–34)
MCHC RBC-ENTMCNC: 33.1 G/DL — SIGNIFICANT CHANGE UP (ref 32–36)
MCV RBC AUTO: 89.2 FL — SIGNIFICANT CHANGE UP (ref 80–100)
NRBC # BLD: 0 /100 WBCS — SIGNIFICANT CHANGE UP (ref 0–0)
PLATELET # BLD AUTO: 188 K/UL — SIGNIFICANT CHANGE UP (ref 150–400)
POTASSIUM SERPL-MCNC: 3.8 MMOL/L — SIGNIFICANT CHANGE UP (ref 3.5–5.3)
POTASSIUM SERPL-SCNC: 3.8 MMOL/L — SIGNIFICANT CHANGE UP (ref 3.5–5.3)
RBC # BLD: 3.79 M/UL — LOW (ref 3.8–5.2)
RBC # FLD: 13 % — SIGNIFICANT CHANGE UP (ref 10.3–14.5)
SODIUM SERPL-SCNC: 141 MMOL/L — SIGNIFICANT CHANGE UP (ref 135–145)
WBC # BLD: 6.57 K/UL — SIGNIFICANT CHANGE UP (ref 3.8–10.5)
WBC # FLD AUTO: 6.57 K/UL — SIGNIFICANT CHANGE UP (ref 3.8–10.5)

## 2024-07-17 RX ORDER — ASPIRIN 325 MG/1
1 TABLET, FILM COATED ORAL
Qty: 60 | Refills: 0
Start: 2024-07-17 | End: 2024-08-15

## 2024-07-17 RX ORDER — SODIUM CHLORIDE 0.9 % (FLUSH) 0.9 %
1000 SYRINGE (ML) INJECTION ONCE
Refills: 0 | Status: COMPLETED | OUTPATIENT
Start: 2024-07-17 | End: 2024-07-17

## 2024-07-17 RX ORDER — PANTOPRAZOLE SODIUM 40 MG/10ML
1 INJECTION, POWDER, FOR SOLUTION INTRAVENOUS
Qty: 30 | Refills: 0
Start: 2024-07-17 | End: 2024-08-15

## 2024-07-17 RX ORDER — CELECOXIB 100 MG/1
1 CAPSULE ORAL
Qty: 60 | Refills: 0
Start: 2024-07-17 | End: 2024-08-15

## 2024-07-17 RX ORDER — OXYCODONE HYDROCHLORIDE 100 MG/5ML
1 SOLUTION ORAL
Qty: 42 | Refills: 0
Start: 2024-07-17 | End: 2024-07-23

## 2024-07-17 RX ORDER — NALOXONE HYDROCHLORIDE 1 MG/ML
4 INJECTION PARENTERAL
Qty: 1 | Refills: 0
Start: 2024-07-17 | End: 2024-07-17

## 2024-07-17 RX ADMIN — PANTOPRAZOLE SODIUM 40 MILLIGRAM(S): 40 INJECTION, POWDER, FOR SOLUTION INTRAVENOUS at 05:35

## 2024-07-17 RX ADMIN — KETOROLAC TROMETHAMINE 1 DROP(S): 5 SOLUTION OPHTHALMIC at 05:36

## 2024-07-17 RX ADMIN — CELECOXIB 200 MILLIGRAM(S): 100 CAPSULE ORAL at 06:05

## 2024-07-17 RX ADMIN — Medication 650 MILLIGRAM(S): at 02:35

## 2024-07-17 RX ADMIN — Medication 500 MILLILITER(S): at 10:29

## 2024-07-17 RX ADMIN — CELECOXIB 200 MILLIGRAM(S): 100 CAPSULE ORAL at 05:35

## 2024-07-17 RX ADMIN — Medication 650 MILLIGRAM(S): at 01:46

## 2024-07-17 RX ADMIN — DEXAMETHASONE 102 MILLIGRAM(S): 1 TABLET ORAL at 05:36

## 2024-07-17 RX ADMIN — Medication 1 TABLET(S): at 11:40

## 2024-07-17 RX ADMIN — CEFAZOLIN 100 MILLIGRAM(S): 10 INJECTION, POWDER, FOR SOLUTION INTRAVENOUS at 03:15

## 2024-07-17 RX ADMIN — DULOXETINE HYDROCHLORIDE 30 MILLIGRAM(S): 20 CAPSULE, DELAYED RELEASE ORAL at 12:22

## 2024-07-17 RX ADMIN — Medication 650 MILLIGRAM(S): at 10:28

## 2024-07-17 RX ADMIN — Medication 500 MILLIGRAM(S): at 05:35

## 2024-07-17 RX ADMIN — DEXTROSE MONOHYDRATE AND SODIUM CHLORIDE 115 MILLILITER(S): 5; .3 INJECTION, SOLUTION INTRAVENOUS at 01:47

## 2024-07-17 RX ADMIN — ASPIRIN 81 MILLIGRAM(S): 325 TABLET, FILM COATED ORAL at 05:35

## 2024-07-17 RX ADMIN — Medication 650 MILLIGRAM(S): at 11:24

## 2024-07-17 NOTE — PROGRESS NOTE ADULT - SUBJECTIVE AND OBJECTIVE BOX
Patient is 59y y/o Female s/p R TKA POD#0  Patient is seen and examined at bedside.   Pt tolerated procedure well without any intra-op complications.    Pain is controlled.  Denies CP/SOB/Dizziness/N/V/D/HA.     Vital Signs Last 24 Hrs  T(C): 36.4 (16 Jul 2024 13:50), Max: 37 (16 Jul 2024 09:35)  T(F): 97.5 (16 Jul 2024 13:50), Max: 98.6 (16 Jul 2024 09:35)  HR: 48 (16 Jul 2024 13:50) (48 - 65)  BP: 127/71 (16 Jul 2024 13:50) (98/67 - 132/84)  BP(mean): --  RR: 17 (16 Jul 2024 13:50) (12 - 17)  SpO2: 100% (16 Jul 2024 13:50) (98% - 100%)    Parameters below as of 16 Jul 2024 13:50  Patient On (Oxygen Delivery Method): room air          PHYSICAL EXAM:  General: A&Ox3 NAD  RLE: Dressing C/D/I with ACE wrap in place. Motor intact + EHL/FHL/TA/GS.  Sensation is grossly intact.  Extremity warm, compartments soft, compressible. No calf tenderness. DP 2+   LLE: Motor intact +EHL/FHL/TA/GS. Sensation is grossly intact. Extremity warm, compartments soft, compressible. No calf tenderness. DP2+    Labs:                          12.6   6.40  )-----------( 213      ( 16 Jul 2024 13:10 )             38.1       07-16    140  |  110<H>  |  16  ----------------------------<  93  4.1   |  27  |  0.73    Ca    8.9      16 Jul 2024 13:10        A/P: Patient is a 59y y/o Female s/p R TKA, POD # 0  -wound care, knee extension/leg elevation, cryocuff, isometric exercises, new medications reviewed with pt  -Pain control/analgesia  -Inc spirometry reviewed with pt, demonstrated competence  -DVT prophylaxis with Venodynes/Aspirin 81 BID  -F/U AM Labs  -PT/OT/WBAT  -prophylactic Antibiotic  -medical consult  -DC planning    
Patient is 59y y/o Female s/p R TKA POD#1  Patient is seen and examined at bedside.   Pt tolerated procedure well without any intra-op complications.    Pain is controlled.  Denies CP/SOB/Dizziness/N/V/D/HA.     Vital Signs Last 24 Hrs  T(C): 36.7 (17 Jul 2024 09:00), Max: 36.7 (16 Jul 2024 12:48)  T(F): 98 (17 Jul 2024 09:00), Max: 98.1 (16 Jul 2024 21:31)  HR: 74 (17 Jul 2024 09:00) (48 - 74)  BP: 111/75 (17 Jul 2024 09:00) (97/60 - 141/87)  BP(mean): --  RR: 17 (17 Jul 2024 09:00) (12 - 18)  SpO2: 97% (17 Jul 2024 09:00) (97% - 100%)    Parameters below as of 17 Jul 2024 09:00  Patient On (Oxygen Delivery Method): room air          PHYSICAL EXAM:  General: A&Ox3 NAD  RLE: Dressing C/D/I with ACE wrap in place. Motor intact + EHL/FHL/TA/GS.  Sensation is grossly intact.  Extremity warm, compartments soft, compressible. No calf tenderness. DP 2+   LLE: Motor intact +EHL/FHL/TA/GS. Sensation is grossly intact. Extremity warm, compartments soft, compressible. No calf tenderness. DP2+    Labs:                          11.2   6.57  )-----------( 188      ( 17 Jul 2024 06:43 )             33.8       07-17    141  |  109<H>  |  19  ----------------------------<  100<H>  3.8   |  25  |  0.65    Ca    8.5      17 Jul 2024 06:43        A/P: Patient is a 59y y/o Female s/p R TKA, POD # 1  -wound care, knee extension/leg elevation, cryocuff, isometric exercises, new medications reviewed with pt  -Pain control/analgesia  -Inc spirometry reviewed with pt, demonstrated competence  -DVT prophylaxis with Venodynes/Aspirin 81 BID  -F/U AM Labs  -PT/OT/WBAT  -prophylactic Antibiotic  -medical consult  -DC planning  Seen with Dr. Palomino    
BRENT CAMPOVERDE is a 59y Female s/p ROBOTIC ASSISTED RIGHT TOTAL KNEE ARTHROPLASTY WITH JASWINDER        denies any chest pain shortness of breath palpitation dizziness lightheadedness nausea vomiting fever or chills    T(C): 36.8 (07-17-24 @ 09:45), Max: 36.8 (07-17-24 @ 09:45)  HR: 71 (07-17-24 @ 09:45) (48 - 74)  BP: 121/72 (07-17-24 @ 09:45) (97/60 - 141/87)  RR: 18 (07-17-24 @ 09:45) (12 - 18)  SpO2: 97% (07-17-24 @ 09:45) (97% - 100%)  no jvd/bruit  s1 s2 rrr  cta  s/nt/nd  no calf tend                        11.2   6.57  )-----------( 188      ( 17 Jul 2024 06:43 )             33.8   07-17    141  |  109<H>  |  19  ----------------------------<  100<H>  3.8   |  25  |  0.65    Ca    8.5      17 Jul 2024 06:43        cont dvt px  pain control  bowel regimen  antiemetics  incentive spirometer

## 2024-07-17 NOTE — DISCHARGE NOTE NURSING/CASE MANAGEMENT/SOCIAL WORK - NSDCFUADDAPPT_GEN_ALL_CORE_FT
Follow up with your surgeon in two weeks. Call for appointment.  If you need more pain medication, call your surgeon's office. For medication refills or authorizations, please call [phone]  We recommend that you call and schedule a follow up appointment within 2-4 weeks with your primary care physician for repeat blood work (CBC and BMP) for post hospital discharge follow-up care.  Call your surgeon if you have increased redness/pain/drainage or fever. Return to ER for shortness of breath/calf tenderness.

## 2024-07-17 NOTE — DISCHARGE NOTE NURSING/CASE MANAGEMENT/SOCIAL WORK - PATIENT PORTAL LINK FT
You can access the FollowMyHealth Patient Portal offered by [facility] by registering at the following website: [email or web address]. By joining [facility]’s FollowMyHealth portal, you will also be able to view your health information using other applications (apps) compatible with our system.

## 2024-07-17 NOTE — DISCHARGE NOTE NURSING/CASE MANAGEMENT/SOCIAL WORK - NSDCPEFALRISK_GEN_ALL_CORE
For information on Fall & Injury Prevention, visit: [email or web address] OR  [email or web address] OR  https://www.cdc.gov/steadi/patient.html

## 2024-07-17 NOTE — DISCHARGE NOTE NURSING/CASE MANAGEMENT/SOCIAL WORK - NSSCCONTNUM_GEN_ALL_CORE
Hypertension is improving with treatment.  Continue current treatment regimen.  Blood pressure will be reassessed at the next regular appointment.  
[phone]

## 2024-07-17 NOTE — DISCHARGE NOTE NURSING/CASE MANAGEMENT/SOCIAL WORK - CAREGIVER NAME
Rx Refill Note  Requested Prescriptions     Pending Prescriptions Disp Refills   • atorvastatin (LIPITOR) 10 MG tablet 90 tablet 1     Sig: Take 1 tablet by mouth every night at bedtime.   • olmesartan (BENICAR) 40 MG tablet 90 tablet 2     Sig: Take 1 tablet by mouth Daily for 270 days.      Last office visit with prescribing clinician: 6/30/2022   Next office visit with prescribing clinician: 2/1/2023      Kerry

## 2024-07-23 DIAGNOSIS — Z98.84 BARIATRIC SURGERY STATUS: ICD-10-CM

## 2024-07-23 DIAGNOSIS — F32.A DEPRESSION, UNSPECIFIED: ICD-10-CM

## 2024-07-23 DIAGNOSIS — I10 ESSENTIAL (PRIMARY) HYPERTENSION: ICD-10-CM

## 2024-07-23 DIAGNOSIS — M17.11 UNILATERAL PRIMARY OSTEOARTHRITIS, RIGHT KNEE: ICD-10-CM

## 2024-07-26 ENCOUNTER — NON-APPOINTMENT (OUTPATIENT)
Age: 59
End: 2024-07-26

## 2024-07-31 ENCOUNTER — APPOINTMENT (OUTPATIENT)
Dept: ORTHOPEDIC SURGERY | Facility: CLINIC | Age: 59
End: 2024-07-31
Payer: COMMERCIAL

## 2024-07-31 DIAGNOSIS — Z96.651 PRESENCE OF RIGHT ARTIFICIAL KNEE JOINT: ICD-10-CM

## 2024-07-31 PROCEDURE — 73564 X-RAY EXAM KNEE 4 OR MORE: CPT | Mod: RT

## 2024-07-31 PROCEDURE — 99024 POSTOP FOLLOW-UP VISIT: CPT

## 2024-07-31 NOTE — HISTORY OF PRESENT ILLNESS
[7] : 7 [Sharp] : sharp [Constant] : constant [Meds] : meds [de-identified] : 2/5/24: Patient is a 58 year old female complaining of bilateral knee pain R>L.  She finished a series of Gelsyn with Dr Ortiz in Dec 2023 and had minimal relief. She takes NSAIDs. She would like to discuss surgical intervention. There was no recent injury to her knees.    6/26/24: here today for F/U on rt knee, here with preop questions  7/31/24: pt is here today for PO#1 rt knee. pt states she still has minimal pain but still has some pain she is improving steadily [] : no [de-identified] : cold weather [de-identified] : Dr Ortiz

## 2024-07-31 NOTE — PHYSICAL EXAM
[NL (0)] : extension 0 degrees [] : patient ambulates without assistive device [Right] : right knee [There are no fractures, subluxations or dislocations. No significant abnormalities are seen] : There are no fractures, subluxations or dislocations. No significant abnormalities are seen [No loss of surgical correlation. Bony alignment acceptable. Hardware in appropriate position] : No loss of surgical correlation. Bony alignment acceptable. Hardware in appropriate position [TWNoteComboBox7] : flexion 105 degrees

## 2024-07-31 NOTE — HISTORY OF PRESENT ILLNESS
[7] : 7 [Sharp] : sharp [Constant] : constant [Meds] : meds [de-identified] : 2/5/24: Patient is a 58 year old female complaining of bilateral knee pain R>L.  She finished a series of Gelsyn with Dr Ortiz in Dec 2023 and had minimal relief. She takes NSAIDs. She would like to discuss surgical intervention. There was no recent injury to her knees.    6/26/24: here today for F/U on rt knee, here with preop questions  7/31/24: pt is here today for PO#1 rt knee. pt states she still has minimal pain but still has some pain she is improving steadily [] : no [de-identified] : cold weather [de-identified] : Dr Ortiz

## 2024-07-31 NOTE — ASSESSMENT
[FreeTextEntry1] : 58F 2 robert s/p R TKA  Begin outpt PT progress activities as tolerated return 4 weeks   We discussed the patient's progress and they were reminded of their antibiotic prophylaxis. We discussed continued physical therapy and/or a home exercise program. Questions about their knee replacement and future follow up were answered and discussed.

## 2024-08-01 PROBLEM — I10 ESSENTIAL (PRIMARY) HYPERTENSION: Chronic | Status: ACTIVE | Noted: 2024-06-25

## 2024-08-01 PROBLEM — R42 DIZZINESS AND GIDDINESS: Chronic | Status: ACTIVE | Noted: 2024-06-25

## 2024-08-01 PROBLEM — F32.A DEPRESSION, UNSPECIFIED: Chronic | Status: ACTIVE | Noted: 2024-06-25

## 2024-08-20 ENCOUNTER — NON-APPOINTMENT (OUTPATIENT)
Age: 59
End: 2024-08-20

## 2024-08-27 ENCOUNTER — NON-APPOINTMENT (OUTPATIENT)
Age: 59
End: 2024-08-27

## 2024-08-28 ENCOUNTER — APPOINTMENT (OUTPATIENT)
Dept: ORTHOPEDIC SURGERY | Facility: CLINIC | Age: 59
End: 2024-08-28
Payer: COMMERCIAL

## 2024-08-28 DIAGNOSIS — Z96.651 PRESENCE OF RIGHT ARTIFICIAL KNEE JOINT: ICD-10-CM

## 2024-08-28 PROCEDURE — 99024 POSTOP FOLLOW-UP VISIT: CPT

## 2024-08-28 NOTE — HISTORY OF PRESENT ILLNESS
[7] : 7 [Sharp] : sharp [Constant] : constant [Meds] : meds [de-identified] : 2/5/24: Patient is a 58 year old female complaining of bilateral knee pain R>L.  She finished a series of Gelsyn with Dr Ortiz in Dec 2023 and had minimal relief. She takes NSAIDs. She would like to discuss surgical intervention. There was no recent injury to her knees.    6/26/24: here today for F/U on rt knee, here with preop questions  7/31/24: pt is here today for PO#1 rt knee. pt states she still has minimal pain but still has some pain she is improving steadily  8/28/24: pt is here today for Po#2 R knee, pt states she is starting to have some pain along the back of her knee. [] : no [de-identified] : cold weather [de-identified] : Dr Ortiz

## 2024-08-28 NOTE — ASSESSMENT
[FreeTextEntry1] : 58F 6 weks s/p R TKA  Continue outpt PT progress activities as tolerated return 10 weeks   We discussed the patient's progress and they were reminded of their antibiotic prophylaxis. We discussed continued physical therapy and/or a home exercise program. Questions about their knee replacement and future follow up were answered and discussed.

## 2024-08-28 NOTE — PHYSICAL EXAM
[NL (0)] : extension 0 degrees [Right] : right knee [There are no fractures, subluxations or dislocations. No significant abnormalities are seen] : There are no fractures, subluxations or dislocations. No significant abnormalities are seen [No loss of surgical correlation. Bony alignment acceptable. Hardware in appropriate position] : No loss of surgical correlation. Bony alignment acceptable. Hardware in appropriate position [] : healed incision [TWNoteComboBox7] : flexion 125 degrees

## 2024-09-16 NOTE — PATIENT PROFILE ADULT - FALL HARM RISK - UNIVERSAL INTERVENTIONS
Bed in lowest position, wheels locked, appropriate side rails in place/Call bell, personal items and telephone in reach/Instruct patient to call for assistance before getting out of bed or chair/Non-slip footwear when patient is out of bed/[place] to call system/Physically safe environment - no spills, clutter or unnecessary equipment/Purposeful Proactive Rounding/Room/bathroom lighting operational, light cord in reach
no

## 2024-09-25 ENCOUNTER — APPOINTMENT (OUTPATIENT)
Dept: ORTHOPEDIC SURGERY | Facility: CLINIC | Age: 59
End: 2024-09-25
Payer: COMMERCIAL

## 2024-09-25 DIAGNOSIS — M54.16 RADICULOPATHY, LUMBAR REGION: ICD-10-CM

## 2024-09-25 DIAGNOSIS — Z96.651 PRESENCE OF RIGHT ARTIFICIAL KNEE JOINT: ICD-10-CM

## 2024-09-25 PROCEDURE — 99024 POSTOP FOLLOW-UP VISIT: CPT

## 2024-09-26 NOTE — PHYSICAL EXAM
[NL (0)] : extension 0 degrees [Right] : right knee [There are no fractures, subluxations or dislocations. No significant abnormalities are seen] : There are no fractures, subluxations or dislocations. No significant abnormalities are seen [No loss of surgical correlation. Bony alignment acceptable. Hardware in appropriate position] : No loss of surgical correlation. Bony alignment acceptable. Hardware in appropriate position [] : no induration [TWNoteComboBox7] : flexion 125 degrees

## 2024-09-26 NOTE — ASSESSMENT
[FreeTextEntry1] : 58F 6 weks s/p R TKA  Continue outpt PT, add in l spime progress activities as tolerated return 10 weeks   We discussed the patient's progress and they were reminded of their antibiotic prophylaxis. We discussed continued physical therapy and/or a home exercise program. Questions about their knee replacement and future follow up were answered and discussed.

## 2024-09-26 NOTE — HISTORY OF PRESENT ILLNESS
[7] : 7 [Sharp] : sharp [Constant] : constant [Meds] : meds [de-identified] : 2/5/24: Patient is a 58 year old female complaining of bilateral knee pain R>L.  She finished a series of Gelsyn with Dr Ortiz in Dec 2023 and had minimal relief. She takes NSAIDs. She would like to discuss surgical intervention. There was no recent injury to her knees.    6/26/24: here today for F/U on rt knee, here with preop questions  7/31/24: pt is here today for PO#1 rt knee. pt states she still has minimal pain but still has some pain she is improving steadily  8/28/24: pt is here today for Po#2 R knee, pt states she is starting to have some pain along the back of her knee.  09/25/2024: pt is here today for f/u on R knee, pt states concerned about of driving pain/ cant sit for driving / states a ache sensation behind knee. sharp pain in knee cap / PT - ongoing, relief with pt. no numbness or tingling [] : no [de-identified] : cold weather [de-identified] : Dr Ortiz

## 2024-10-14 ENCOUNTER — NON-APPOINTMENT (OUTPATIENT)
Age: 59
End: 2024-10-14

## 2024-12-19 ENCOUNTER — NON-APPOINTMENT (OUTPATIENT)
Age: 59
End: 2024-12-19

## 2025-05-07 ENCOUNTER — APPOINTMENT (OUTPATIENT)
Dept: ORTHOPEDIC SURGERY | Facility: CLINIC | Age: 60
End: 2025-05-07

## 2025-05-26 ENCOUNTER — NON-APPOINTMENT (OUTPATIENT)
Age: 60
End: 2025-05-26

## 2025-07-03 ENCOUNTER — NON-APPOINTMENT (OUTPATIENT)
Age: 60
End: 2025-07-03

## 2025-09-03 ENCOUNTER — APPOINTMENT (OUTPATIENT)
Dept: ORTHOPEDIC SURGERY | Facility: CLINIC | Age: 60
End: 2025-09-03
Payer: COMMERCIAL

## 2025-09-03 VITALS — HEIGHT: 66 IN | BODY MASS INDEX: 34.55 KG/M2 | WEIGHT: 215 LBS

## 2025-09-03 DIAGNOSIS — Z96.651 PRESENCE OF RIGHT ARTIFICIAL KNEE JOINT: ICD-10-CM

## 2025-09-03 PROCEDURE — 99213 OFFICE O/P EST LOW 20 MIN: CPT

## 2025-09-03 PROCEDURE — 73564 X-RAY EXAM KNEE 4 OR MORE: CPT | Mod: RT

## (undated) DEVICE — SUT STRATAFIX SPIRAL PDS PLUS 2-0 45CM CT-1

## (undated) DEVICE — STRYKER MIXEVAC 3 BONE CEMENT MIXER

## (undated) DEVICE — ELCTR STRYKER NEPTUNE SMOKE EVACUATION PENCIL (GREEN)

## (undated) DEVICE — FRA-ESU BOVIE FORCE TRIAD T7J19717DX: Type: DURABLE MEDICAL EQUIPMENT

## (undated) DEVICE — DRSG PICO NPWT 4X12"

## (undated) DEVICE — MAKO CHECKPOINT KIT FEMORAL / TIBIAL

## (undated) DEVICE — SUT PDO 2 1/2 CIRCLE 40MM NDL 45CM

## (undated) DEVICE — MEDICATION LABELS W MARKER

## (undated) DEVICE — SOL IRR BAG NS 0.9% 3000ML

## (undated) DEVICE — MAKO BLADE STANDARD

## (undated) DEVICE — SUT VICRYL 0 36" CT-1 UNDYED

## (undated) DEVICE — SAW BLADE STRYKER SAGITTAL EXTRA WIDE THIN SHORT

## (undated) DEVICE — MAKO DRAPE KIT

## (undated) DEVICE — MAKO VIZADISC KNEE TRACKING KIT

## (undated) DEVICE — DRSG ACE BANDAGE 6"

## (undated) DEVICE — WOUND IRR IRRISEPT W 0.5 CHG

## (undated) DEVICE — ZIMMER PULSAVAC PLUS FAN KIT

## (undated) DEVICE — DRSG DERMABOND PRINEO 22CM

## (undated) DEVICE — SUT STRATAFIX SPIRAL MONOCRYL PLUS 4-0 45CM PS-2 UNDYED

## (undated) DEVICE — DRSG TELFA 3 X 8

## (undated) DEVICE — DRSG COBAN 6"

## (undated) DEVICE — HOOD T5 PEELAWAY

## (undated) DEVICE — ELCTR S&N WAND WEREWOLF FASTSEAL 6MM

## (undated) DEVICE — POSITIONER FOAM BUMP FLAT TOP 10X6X4" LRG

## (undated) DEVICE — SYR ASEPTO

## (undated) DEVICE — SYR LUER LOK 20CC

## (undated) DEVICE — NDL HYPO SAFE 18G X 1.5" (PINK)

## (undated) DEVICE — PACK TOTAL KNEE

## (undated) DEVICE — DRSG 4 X 4" 4PLY STERILE

## (undated) DEVICE — CRYO/CUFF GRAVITY COOLER KNEE LARGE